# Patient Record
Sex: MALE | Race: WHITE | NOT HISPANIC OR LATINO | URBAN - METROPOLITAN AREA
[De-identification: names, ages, dates, MRNs, and addresses within clinical notes are randomized per-mention and may not be internally consistent; named-entity substitution may affect disease eponyms.]

---

## 2017-10-04 ENCOUNTER — SURGERY - HEALTHEAST (OUTPATIENT)
Dept: SURGERY | Facility: CLINIC | Age: 62
End: 2017-10-04

## 2017-10-04 ENCOUNTER — ANESTHESIA - HEALTHEAST (OUTPATIENT)
Dept: SURGERY | Facility: CLINIC | Age: 62
End: 2017-10-04

## 2017-10-05 ENCOUNTER — HOME CARE/HOSPICE - HEALTHEAST (OUTPATIENT)
Dept: HOME HEALTH SERVICES | Facility: HOME HEALTH | Age: 62
End: 2017-10-05

## 2017-10-12 ENCOUNTER — RECORDS - HEALTHEAST (OUTPATIENT)
Dept: ADMINISTRATIVE | Facility: OTHER | Age: 62
End: 2017-10-12

## 2017-10-23 ENCOUNTER — COMMUNICATION - HEALTHEAST (OUTPATIENT)
Dept: ENDOCRINOLOGY | Facility: CLINIC | Age: 62
End: 2017-10-23

## 2017-10-24 ENCOUNTER — AMBULATORY - HEALTHEAST (OUTPATIENT)
Dept: SCHEDULING | Facility: CLINIC | Age: 62
End: 2017-10-24

## 2017-10-24 DIAGNOSIS — Z79.899 MEDICATION MANAGEMENT: ICD-10-CM

## 2017-10-25 ENCOUNTER — HOSPITAL ENCOUNTER (OUTPATIENT)
Dept: INTERVENTIONAL RADIOLOGY/VASCULAR | Facility: HOSPITAL | Age: 62
Discharge: HOME | End: 2017-10-25
Attending: STUDENT IN AN ORGANIZED HEALTH CARE EDUCATION/TRAINING PROGRAM

## 2017-10-25 ENCOUNTER — OFFICE VISIT - HEALTHEAST (OUTPATIENT)
Dept: OTHER | Facility: HOSPITAL | Age: 62
End: 2017-10-25

## 2017-10-25 DIAGNOSIS — T84.50XA INFECTION AND INFLAMMATORY REACTION DUE TO INTERNAL JOINT PROSTHESIS (H): ICD-10-CM

## 2017-10-27 ENCOUNTER — RECORDS - HEALTHEAST (OUTPATIENT)
Dept: ADMINISTRATIVE | Facility: OTHER | Age: 62
End: 2017-10-27

## 2017-11-01 ENCOUNTER — HOME INFUSION (PRE-WILLOW HOME INFUSION) (OUTPATIENT)
Dept: PHARMACY | Facility: CLINIC | Age: 62
End: 2017-11-01

## 2017-11-03 ENCOUNTER — OFFICE VISIT - HEALTHEAST (OUTPATIENT)
Dept: INFECTIOUS DISEASES | Facility: CLINIC | Age: 62
End: 2017-11-03

## 2017-11-03 DIAGNOSIS — Z96.649 INFECTION OF PROSTHETIC HIP JOINT, INITIAL ENCOUNTER (H): ICD-10-CM

## 2017-11-03 DIAGNOSIS — T84.59XA INFECTION OF PROSTHETIC HIP JOINT, INITIAL ENCOUNTER (H): ICD-10-CM

## 2017-11-08 ENCOUNTER — HOME INFUSION (PRE-WILLOW HOME INFUSION) (OUTPATIENT)
Dept: PHARMACY | Facility: CLINIC | Age: 62
End: 2017-11-08

## 2017-11-09 ENCOUNTER — HOME INFUSION (PRE-WILLOW HOME INFUSION) (OUTPATIENT)
Dept: PHARMACY | Facility: CLINIC | Age: 62
End: 2017-11-09

## 2017-11-10 NOTE — PROGRESS NOTES
This is a recent snapshot of the patient's [facility] medical record.  For current drug dose and complete information and questions, call [phone] or In Basket pool, fv home infusion ([id number])  CSN Number:  [id number]

## 2017-11-15 ENCOUNTER — HOME INFUSION (PRE-WILLOW HOME INFUSION) (OUTPATIENT)
Dept: PHARMACY | Facility: CLINIC | Age: 62
End: 2017-11-15

## 2017-11-15 NOTE — PROGRESS NOTES
This is a recent snapshot of the patient's [facility] medical record.  For current drug dose and complete information and questions, call [phone] or In Basket pool, fv home infusion ([id number])  CSN Number:  [id number]

## 2017-11-16 ENCOUNTER — HOME INFUSION (PRE-WILLOW HOME INFUSION) (OUTPATIENT)
Dept: PHARMACY | Facility: CLINIC | Age: 62
End: 2017-11-16

## 2017-11-16 ENCOUNTER — COMMUNICATION - HEALTHEAST (OUTPATIENT)
Dept: INFECTIOUS DISEASES | Facility: CLINIC | Age: 62
End: 2017-11-16

## 2017-11-17 NOTE — PROGRESS NOTES
This is a recent snapshot of the patient's [facility] medical record.  For current drug dose and complete information and questions, call [phone] or In [facility] pool, fv home infusion ([id number])  CSN Number:  [id number]

## 2017-11-17 NOTE — PROGRESS NOTES
This is a recent snapshot of the patient's [facility] medical record.  For current drug dose and complete information and questions, call [phone] or In Basket pool, fv home infusion ([id number])  CSN Number:  [id number]

## 2017-11-22 ENCOUNTER — COMMUNICATION - HEALTHEAST (OUTPATIENT)
Dept: INFECTIOUS DISEASES | Facility: CLINIC | Age: 62
End: 2017-11-22

## 2017-11-22 DIAGNOSIS — T84.50XD INFECTION OF PROSTHETIC JOINT, SUBSEQUENT ENCOUNTER: ICD-10-CM

## 2017-11-24 ENCOUNTER — HOME INFUSION (PRE-WILLOW HOME INFUSION) (OUTPATIENT)
Dept: PHARMACY | Facility: CLINIC | Age: 62
End: 2017-11-24

## 2017-11-25 ENCOUNTER — HOME INFUSION (PRE-WILLOW HOME INFUSION) (OUTPATIENT)
Dept: PHARMACY | Facility: CLINIC | Age: 62
End: 2017-11-25

## 2017-11-27 NOTE — PROGRESS NOTES
This is a recent snapshot of the patient's [facility] medical record.  For current drug dose and complete information and questions, call [phone] or In Basket pool, fv home infusion ([id number])  CSN Number:  [id number]

## 2017-11-29 ENCOUNTER — HOME INFUSION (PRE-WILLOW HOME INFUSION) (OUTPATIENT)
Dept: PHARMACY | Facility: CLINIC | Age: 62
End: 2017-11-29

## 2017-11-29 NOTE — PROGRESS NOTES
This is a recent snapshot of the patient's [facility] medical record.  For current drug dose and complete information and questions, call [phone] or In Basket pool, fv home infusion ([id number])  CSN Number:  [id number]

## 2017-12-01 NOTE — PROGRESS NOTES
This is a recent snapshot of the patient's [facility] medical record.  For current drug dose and complete information and questions, call [phone] or In Basket pool, fv home infusion ([id number])  CSN Number:  [id number]

## 2017-12-08 ENCOUNTER — RECORDS - HEALTHEAST (OUTPATIENT)
Dept: ADMINISTRATIVE | Facility: OTHER | Age: 62
End: 2017-12-08

## 2017-12-26 ENCOUNTER — COMMUNICATION - HEALTHEAST (OUTPATIENT)
Dept: ADMINISTRATIVE | Facility: CLINIC | Age: 62
End: 2017-12-26

## 2017-12-26 DIAGNOSIS — T84.50XS INFECTION OF PROSTHETIC JOINT, SEQUELA: ICD-10-CM

## 2017-12-27 ENCOUNTER — COMMUNICATION - HEALTHEAST (OUTPATIENT)
Dept: SCHEDULING | Facility: CLINIC | Age: 62
End: 2017-12-27

## 2018-01-12 ENCOUNTER — RECORDS - HEALTHEAST (OUTPATIENT)
Dept: ADMINISTRATIVE | Facility: OTHER | Age: 63
End: 2018-01-12

## 2021-05-31 VITALS — WEIGHT: 264.55 LBS

## 2021-06-13 NOTE — ANESTHESIA PREPROCEDURE EVALUATION
Anesthesia Evaluation      Patient summary reviewed   No history of anesthetic complications     Airway   Mallampati: II   Pulmonary - normal exam   (+) sleep apnea on no CPAP, ,                          Cardiovascular   (+) hypertension, ,     Rhythm: regular  Rate: normal,         Neuro/Psych      Endo/Other - negative ROS      GI/Hepatic/Renal - negative ROS           Dental                         Anesthesia Plan  Planned anesthetic: general LMA and general endotracheal    ASA 3     Anesthetic plan and risks discussed with: patient    Post-op plan: routine recovery

## 2021-06-13 NOTE — ANESTHESIA POSTPROCEDURE EVALUATION
Patient: Kendall Mendez  RESECTION OF  ARTHROPLASTY AND REPLACEMENT WITH ANTIBIOTIC SPACERS AND IRRIGATION AND DEBRIDEMENT, RIGHT HIP  Anesthesia type: general    Patient location: PACU  Last vitals:   Vitals:    10/04/17 2055   BP: 122/67   Pulse: 83   Resp: 18   Temp:    SpO2: 96%     Post vital signs: stable  Level of consciousness: awake, alert, oriented and responds to simple questions  Post-anesthesia pain: pain needs to be addressed; narcotics and adjunct therapy used  Post-anesthesia nausea and vomiting: no  Pulmonary: unassisted, return to baseline  Cardiovascular: stable and blood pressure at baseline  Hydration: adequate  Anesthetic events: no    QCDR Measures:  ASA# 11 - Makayla-op Cardiac Arrest: ASA11B - Patient did NOT experience unanticipated cardiac arrest  ASA# 12 - Makayla-op Mortality Rate: ASA12B - Patient did NOT die  ASA# 13 - PACU Re-Intubation Rate: ASA13B - Patient did NOT require a new airway mgmt  ASA# 10 - Composite Anes Safety: ASA10A - No serious adverse event    Additional Notes:

## 2021-06-13 NOTE — ANESTHESIA CARE TRANSFER NOTE
Last vitals:   Vitals:    10/04/17 2007   BP: 105/62   Pulse: 89   Resp: 25   Temp: 37.5  C (99.5  F)   SpO2: 100%     Patient's level of consciousness is awake  Spontaneous respirations: yes  Maintains airway independently: yes  Dentition unchanged: yes  Oropharynx: oropharynx clear of all foreign objects    QCDR Measures:  ASA# 20 - Surgical Safety Checklist: WHO surgical safety checklist completed prior to induction  PQRS# 430 - Adult PONV Prevention: 4558F-8P - Pt did NOT receive => 2 anti-emetic agents  ASA# 8 - Peds PONV Prevention: NA - Not pediatric patient, not GA or 2 or more risk factors NOT present  PQRS# 424 - Makayla-op Temp Management: 4559F - At least one body temp DOCUMENTED => 35.5C or 95.9F within required timeframe  PQRS# 426 - PACU Transfer Protocol: - Transfer of care checklist used  ASA# 14 - Acute Post-op Pain: ASA14A - Patient experienced pain >= 7 out of 10 Pt breathing spontaneously, follows commands, suctioned extubated. Spontaneous respirations with SFM to PACU, VSS

## 2021-06-16 PROBLEM — M25.559 HIP PAIN: Status: ACTIVE | Noted: 2017-10-04

## 2021-06-25 NOTE — PROGRESS NOTES
Progress Notes by Trae Regalado MD at 11/3/2017  9:00 AM     Author: Trae Regalado MD Service: -- Author Type: Physician    Filed: 11/3/2017  9:45 AM Encounter Date: 11/3/2017 Status: Signed    : Trae Regalado MD (Physician)                                 [facility] post-hospital stay follow up.    Name: Kendall Mendez  :   1955  MRN:   [id number]  PCP:    Jb Guan MD  DOS:    17    CC: Post Hospital Stay follow up for prosthetic R hip infection.     HPI/Interval History:  Kendall Mendez is a 62 y.o. male who is S/P Right hip arthroplasty on 17. He was admitted about a month ago with Early infection right hip arthroplasty, with drainage. He underwent RESECTION OF  ARTHROPLASTY AND REPLACEMENT WITH ANTIBIOTIC SPACERS AND IRRIGATION AND DEBRIDEMENT, 10/4, Dr Solares.  Cultures with MSSA. Discharged on  IV Ancef 2 g every 8 hours and p.o. rifampin 600 mg once daily. Plan 6 weeks from 10/4.  In clinic today, the patient reports feeling much better.  No right hip pain.  He does report having some pain on the anterior aspect of the right thigh.  He has been taking 4 tablets of ibuprofen and Tylenol on a daily basis.  He was seen recently by Ortho and was started on narcotics.  He denies fever, chills, night sweats.  On 2017, the patient's peak came out.  This was replaced on 10/25 2017.  PICC is functioning fine now.  He told me he was seen by Ortho and was told that if he is doing fine may be dealt keep the spacer in place instead of the traditional IV antibiotic followed by antibiotic holiday followed by aspiration followed by a new hip.  He is scheduled to see Dr. Solares in 3-4 weeks.    PMH:  Past Medical History:   Diagnosis Date   ? Hypertension    ? Sleep apnea        PSH:  Past Surgical History:   Procedure Laterality Date   ? PICC AND MIDLINE TEAM LINE INSERTION  10/5/2017        ? REVISION TOTAL HIP ARTHROPLASTY Right 10/4/2017     Procedure: RESECTION OF  ARTHROPLASTY AND REPLACEMENT WITH ANTIBIOTIC SPACERS AND IRRIGATION AND DEBRIDEMENT, RIGHT HIP;  Surgeon: Yaakov Solares MD;  Location: [facility];  Service:        Social History/Family History:  Does not smoke.  No drinking currently.  No family history contributory to the current disease process.      Allergies:  No Known Allergies    Medications:  Current Outpatient Prescriptions on File Prior to Visit   Medication Sig Dispense Refill   ? acetaminophen (TYLENOL) 500 MG tablet Take 2 tablets (1,000 mg total) by mouth 3 (three) times a day.  0   ? aspirin 325 MG tablet Take 1 tablet (325 mg total) by mouth 2 (two) times a day. 60 tablet 0   ? ceFAZolin in D5W (ANCEF) 2 gram/100 mL PgBk Infuse 100 mL (2 g total) into a venous catheter every 8 (eight) hours. 88459 mL 0   ? docusate sodium (COLACE) 100 MG capsule Take 1 capsule (100 mg total) by mouth 2 (two) times a day.  0   ? gabapentin (NEURONTIN) 100 MG capsule Take 200 mg by mouth 2 (two) times a day.     ? losartan (COZAAR) 100 MG tablet Take 100 mg by mouth daily.     ? polyethylene glycol (MIRALAX) 17 gram packet Take 1 packet (17 g total) by mouth daily as needed.  0   ? QUEtiapine (SEROQUEL) 100 MG tablet Take 150 mg by mouth at bedtime.      ? rifAMPin (RIFADIN) 300 MG capsule Take 2 capsules (600 mg total) by mouth daily. 60 capsule 1   ? tamsulosin (FLOMAX) 0.4 mg Cp24 Take 1 capsule (0.4 mg total) by mouth Daily after breakfast. 30 capsule 0   ? venlafaxine (EFFEXOR-XR) 75 MG 24 hr capsule Take 225 mg by mouth daily.     ? [DISCONTINUED] oxyCODONE (ROXICODONE) 5 MG immediate release tablet Take 1-2 tablets (5-10 mg total) by mouth every 4 (four) hours as needed for pain. 50 tablet 0     No current facility-administered medications on file prior to visit.        Review of System:  12 points review of system is negative except for findings in the HPI.    Exam  VS:   Vitals:    11/03/17 0903   BP: 160/82   Pulse: 100      Gen: Pleasant in no acute distress.  HEENT: NCAT. EOMI.  Neck: No LAD.  Lungs: Clear to auscultation bilaterally with no crackles or wheezes.   Card: RRR. No RMG. Peripheral pulses present and symmetrical. No edema.   Abd: Soft NT ND. No hepatomegaly or splenomegaly.  Ext: Right hip incision looks really good with healed wounds, no erythema, no tenderness, no warmth.  No fluctuance.  Lymph: No cervical or supraclavicular adenopathy.  Skin: No rash  Neuro: Alert and oriented to place time and person. Cranial nerves grossly intact.     Labs:  Results    Culture/Gram Stain: Tissue (Order [id number])          Culture/Gram Stain: Tissue   Status:  Final result   Visible to patient:  No (Not Released) Next appt:  None Order: [id number]     Culture      1+ Staphylococcus aureus (!)            Stain      1+ Polymorphonuclear leukocytes          No organisms seen              Resulting Agency: [facility]       Susceptibility       Staphylococcus aureus       NIC       Cefazolin <=2  Sensitive       Clindamycin <=0.5  Sensitive       Doxycycline <=0.5  Sensitive       Erythromycin >4  Resistant       Levofloxacin >4  Resistant       Oxacillin 0.5  Sensitive       Trimethoprim + Sulfamethoxazole <=1/19  Sensitive       Vancomycin <=0.5  Sensitive                          Results for YAZAN SELLERS (MRN [id number]) as of 11/3/2017 08:58   10/7/2017 14:26 10/11/2017 14:30 10/18/2017 13:36 10/25/2017 17:30 11/1/2017 17:30   CRP 15.1 (H) 4.1 (H) 4.1 (H) 3.0 (H) 4.7 (H)       Imaging:  None new.    Assessment:  1. S/P Right hip arthroplasty on 9/7/17  2. Early infection right hip arthroplasty, with drainage, and early presentation  Status post RESECTION OF  ARTHROPLASTY AND REPLACEMENT WITH ANTIBIOTIC SPACERS AND IRRIGATION AND DEBRIDEMENT, 10/4, Dr Solares.  Cultures with MSSA. Discharged on  IV Ancef 2 g every 8 hours and p.o. rifampin 600 mg once daily. Plan 6 weeks from 10/4.   CRP now plateau'ed in the 3-4 range for the past 3  weeks.  Reason for this plateaued CRP is not clear.  Could be from inflammation caused by the need to replace the PICC line?  Next CRP will tell.  He is due to complete 6 weeks of IV antibiotic on 11/14/2017.  I may prolong beyond that point if CRP normal normalize.  Patient is to call me if pain worsens so that we can consider imaging of the hip itself.       Recommendations:  -Continue Ancef 2 gm Q 8 hours till at least 11/14 (6 weeks)  -If CRP not at goal, we will likely prolong the course of IV antibiotic therapy.   -Weekly CBC/Diff, CMP and CRP.   -Typically in this case after we are satisfied with the IV antibiotic therapy, patients get 1-2 weeks of antibiotic holiday, followed by aspiration of the joint for cultures and if cultures are negative new hip can be placed.   -If Ortho wants to go a different route, then once we are satisfied with the IV antibiotic I will start you on oral antibiotic for 6-12 months.  Looking at Levaquin 750 once daily.  -Keep an eye on the pain on the front of the right thigh. Call me if worsens.   -Keep your appointment with ortho in 4 weeks.     CC: Dr. Solares of ortho.       RADHA Regalado  [facility]  [facility]  [phone] Option-2

## 2022-04-20 ENCOUNTER — TRANSCRIBE ORDERS (OUTPATIENT)
Dept: PHYSICAL MEDICINE AND REHAB | Facility: CLINIC | Age: 67
End: 2022-04-20
Payer: COMMERCIAL

## 2022-04-20 DIAGNOSIS — M17.12 OSTEOARTHRITIS OF LEFT KNEE: Primary | ICD-10-CM

## 2023-11-11 NOTE — PROGRESS NOTES
This is a recent snapshot of the patient's [facility] medical record.  For current drug dose and complete information and questions, call [phone] or In Basket pool, fv home infusion ([id number])  CSN Number:  [id number]       Attending Attestation (For Attendings USE Only)...

## 2024-11-11 RX ORDER — ATORVASTATIN CALCIUM 40 MG/1
40 TABLET, FILM COATED ORAL AT BEDTIME
COMMUNITY

## 2024-11-11 RX ORDER — AMLODIPINE BESYLATE 10 MG/1
10 TABLET ORAL AT BEDTIME
COMMUNITY

## 2025-03-03 NOTE — PROGRESS NOTES
Discharge plan according to [facility]:     11/11/24 0832   Discharge Planning   Patient/Family Anticipates Transition to home   Concerns to be Addressed all concerns addressed in this encounter   Living Arrangements   People in Home child(mj), adult   Type of Residence Private Residence   Is your private residence a single family home or apartment? Single family home   Once home, are you able to live on one level? Yes   Which level? Main Level   Bathroom Shower/Tub Tub/Shower unit   Equipment Currently Used at Home none   Support System   Support Systems Children   Do you have someone available to stay with you one or two nights once you are home? Yes

## 2025-03-19 NOTE — H&P (VIEW-ONLY)
Pre-Operative Assessment        3/19/2025   Pre-Op Assessment Procedure Details   Procedure Lt knee replacement   Surgeon Dr Brantley   Location Other   Other Location Name [facility]   Procedure Date 3/26/2025   Fax Number [phone]         Marsha Appiah is a 69 y.o. old male here for pre-operative evaluation for procedure noted above.         Patient Active Problem List    Diagnosis Date Noted     CAREPLAN: ANTICOAGULATION DOAC 06/13/2023     Overview Note:     DOAC Anticoagulation Careplan for Kendall Mendez    Medication: apixaban (Eliquis)  Diagnoses: Atrial Fibrillation  Initiation date (AKA date when started on DOAC medication ): 6/12/23.    Length of treatment: Indefinite.  Comments:      Sarah Darling RN       Paroxysmal atrial fibrillation ([facility]) 06/13/2023     Overview Note:     Formatting of this note might be different from the original.   New atrial fibrillation detected on 6/11/23 on LINQ monitor       Prosthetic hip infection ([facility]) 10/24/2022     Morbid obesity ([facility]) 09/27/2022     Pre-diabetes 09/27/2022     CVA (cerebral vascular accident) ([facility]) 09/24/2022     Spinal stenosis of lumbar region with neurogenic claudication 10/30/2018     Spondylolisthesis at L4-L5 level 10/30/2018     Major depressive disorder, recurrent episode, mild ([facility]) 09/01/2016     Generalized anxiety disorder ([facility]) 09/01/2016     HTN (hypertension) ([facility]) 03/13/2015     Chronic back pain 03/13/2015     Overview Note:     Work comp related       FRANCESCO (obstructive sleep apnea) 03/13/2015     BMI 40.0-44.9, adult ([facility]) 03/13/2015     Dysthymic disorder ([facility]) 12/17/2014     Alcohol dependence in remission ([facility]) 12/17/2014     Overview Note:     Other and unspecified alcohol dependence, in remission          No past medical history on file.     Past Surgical History:   Procedure Laterality Date     hip replacment Right 2017     REVISION TOTAL HIP ARTHROPLASTY Right 09/2018     temporary hip spacer Right 2017    for  infection, 3 weeks post original hip replacement        Current Outpatient Medications   Medication Instructions     amLODIPine (NORVASC) 10 mg, Oral, DAILY     atorvastatin (LIPITOR) 40 MG tablet TAKE 1 TABLET(40 MG) BY MOUTH DAILY     ELIQUIS 5 MG tablet TAKE 1 TABLET(5 MG) BY MOUTH TWICE DAILY     losartan (COZAAR) 100 MG tablet TAKE 1 TABLET(100 MG) BY MOUTH DAILY     metFORMIN (GLUCOPHAGE) 500 mg, Oral, BID WITH MEALS     QUEtiapine (SEROQUEL) 300 mg, Oral, HS     semaglutide-weight management (WEGOVY) 2.4 mg, Subcutaneous, WEEKLY     venlafaxine (EFFEXORXR) 300 mg, Oral, DAILY       Allergies   Allergen Reactions     Lisinopril Cough       Social History     Occupational History     Not on file   Tobacco Use     Smoking status: Former     Current packs/day: 0.00     Average packs/day: 1 pack/day for 10.0 years (10.0 ttl pk-yrs)     Types: Cigarettes     Quit date: 1994     Years since quittin.6     Smokeless tobacco: Never     Tobacco comments:     On off smoker since teen  til adult   Vaping Use     Vaping status: Never Used   Substance and Sexual Activity     Alcohol use: No     Comment: quit 25 years ago     Drug use: No     Sexual activity: Not Currently     Partners: Female     Birth control/protection: Vasectomy         No LMP for male patient.    Family History   Problem Relation Name Age of Onset     Other Birth Mother          hx of aneurysm, thinks heart, not sure     Stroke Birth Father Me          at age of 73-75     Hypertension Brother Me         1     Heart Sister           from heart attack at 66     Heart Brother           from heart attack at age of 65     Cancer, Prostate Negative Family History       Cancer, Colon Negative Family History         Review of Systems:        3/19/2025   ET Amb PreOp Assessment Sx   Have you had a heart attack in the last 30 days? No   Have you experienced chest tightening or chest pressure with activity? No   Do you wake at night with  difficulty breathing? No   Do you have swelling in your feet or ankles? No   Do you get short of breath if lying flat at night? No   Do you hear wheezing or whistling when you breathe? No   Have you had a cough, runny nose, or cold symptoms in the last 2 weeks? No   Have you tested positive for Covid in the last 6 months? No   Do you have a long-standing cough? No   Do you snore or are you sleepy during the day? Yes   Do you have any symptoms due to a recent concussion? No   Do you or close relatives have bleeding or clotting problems? No   Have you taken Aspirin, Ibuprofen (Advil) or Naproxen (Aleve) in the last 7 days? No   Do you or close relatives have a history of a severe or life-threatening reaction to anesthesia? No           Estimated Functional Capacity  Can you climb one flight of stairs, or walk up a gradual uphill without stopping? yes, functional capacity is more than or equal to 4 METS    Objective   /77 (BP Location: Right Arm, BP Cuff Size: Large)   Pulse 80   Wt 254 lb (115.2 kg)   BMI 35.43 kg/m      Physical Exam:  General Appearance: alert, well appearing, and in no apparent distress  Eyes:  lids normal, sclera clear, and conjunctiva normal  ENT:  oropharynx clear  Neck:  no lymphadenopathy and no thyromegaly or nodules  Heart:  regular rate and rhythm and no murmurs, gallops or rubs  Lungs:  clear to auscultation and no wheezes, rales or rhonchi  Abdomen:  soft, nondistended, nontender, no palpable masses, and no organomegaly  Extremities:  no edema  Skin:  no rashes or worrisome lesions  Neurologic:  normal speech and no facial droop    Data:      Labs: Yes:   Sodium   Date Value Ref Range Status   03/19/2025 133 (L) 136 - 145 mmol/L Final     Potassium   Date Value Ref Range Status   03/19/2025 4.2 3.5 - 5.1 mmol/L Final     Creatinine   Date Value Ref Range Status   03/19/2025 0.70 (L) 0.73 - 1.18 mg/dL Final     Hemoglobin   Date Value Ref Range Status   03/19/2025 12.3 (L) 13.5 -  17.5 g/dL Final     Hemoglobin A1C   Date Value Ref Range Status   03/19/2025 5.1 <=5.6 % Final     Glucose   Date Value Ref Range Status   08/16/2024 92 70 - 100 mg/dL Final     Comment:     The given reference range is for the fasting state. Non-fasting reference range for glucose is 70 - 180 mg/dL.     ECG: Today: 3/19/2025.   Sinus rhythm  Minimal voltage criteria for LVH, may be normal variant  Borderline ECG  When compared with ECG of 12-JUN-2024 12:35,  Premature atrial complexes are no longer Present   .    Assessment/Plan   Patient is medically optimized for planned procedure(s).      ICD-10-CM    1. Preop examination  Z01.818 Hgb A1C     Sodium     Potassium     Creatinine / GFR     Complete Blood Count-No Diff      2. Localized osteoarthritis of left knee  M17.12 Hgb A1C     Sodium     Potassium     Creatinine / GFR     Complete Blood Count-No Diff      3. Chronic pain of left knee  M25.562 Hgb A1C    G89.29 Sodium     Potassium     Creatinine / GFR     Complete Blood Count-No Diff      4. Encounter for Medicare annual wellness exam  Z00.00 Hgb A1C     Sodium     Potassium     Creatinine / GFR     Complete Blood Count-No Diff      5. Screening for colon cancer  Z12.11 Hgb A1C     Sodium     Potassium     Creatinine / GFR     Complete Blood Count-No Diff      6. Paroxysmal atrial fibrillation ([facility])  I48.0 Hgb A1C     Sodium     Potassium     Creatinine / GFR     Complete Blood Count-No Diff      7. Hypertension, unspecified type ([facility])  I10 Hgb A1C     Sodium     Potassium     Creatinine / GFR     Complete Blood Count-No Diff      8. Generalized anxiety disorder ([facility])  F41.1 Hgb A1C     Sodium     Potassium     Creatinine / GFR     Complete Blood Count-No Diff      9. Major depressive disorder, recurrent episode, mild ([facility])  F33.0 Hgb A1C     Sodium     Potassium     Creatinine / GFR     Complete Blood Count-No Diff      10. Chronic low back pain without sciatica, unspecified back pain laterality  M54.50  Hgb A1C    G89.29 Sodium     Potassium     Creatinine / GFR     Complete Blood Count-No Diff      11. Spondylolisthesis at L4-L5 level  M43.16 Hgb A1C     Sodium     Potassium     Creatinine / GFR     Complete Blood Count-No Diff      12. FRANCESCO (obstructive sleep apnea)  G47.33 Hgb A1C     Sodium     Potassium     Creatinine / GFR     Complete Blood Count-No Diff      13. Morbid obesity ([facility])  E66.01 Hgb A1C     Sodium     Potassium     Creatinine / GFR     Complete Blood Count-No Diff            Special risks:  Thromboembolism elevated risk, recommend post operative prophylaxis.    Medication recommendations:    Patient Instructions   Annual Wellness Visit Summary    Your care team is recommending the following tests, procedures or services.  Some of these recommendations may not be fully covered by Medicare or your insurance.  If you have questions, check with your insurance to determine coverage before completing these services.     Health Maintenance Due   Health Maintenance Due   Topic Date Due     Hep C Screening (Preventive Services)  Never done     PSA Screening Discussion  Never done     Zoster/Shingles (2 of 2) 02/11/2020     Pneumococcal 50+ Yrs (2 of 2 - PCV) 12/27/2020     Abdominal Aortic Aneurysm (AAA) Screening  Never done     FIT Colon Cancer Screening  10/28/2024     Medicare Annual Wellness Visit  01/01/2025     DTaP/Tdap/Td (2 - Tdap) 03/13/2025     COVID-19 Vaccine (7 - 2024-25 season) 03/19/2025       If your Medicare Welcome or Annual Wellness Visit is showing you are due in the above list, this will be updated after this visit. You had this completed today and are not due for another year.      Thank you for coming in for your Medicare Wellness Visit. To make sure we are doing our best to meet your care needs, here are a few important reminders.   We want to know your thoughts as we work together to create your care plan, including stopping and starting medications. When we work together on  next steps, it's called shared decision making. If there is anything else you would like to discuss, please reach out or schedule a follow-up appointment if needed. We are here to listen.   We want to help you address any concerns you have about the cost of your medications. To find options for the most cost-effective medications near you, go to https://www.Figgu/hp/pharmacy/drug-cost/index.html You can also find more information in this handout.   Health care can be complicated. Sometimes, it can help to share your health information with your family or caregivers. (Caregivers can be friends as well as family.) How much you share is up to you. Here is a helpful link: https://www.[employer]/blog/health-care-proxy-benefits/  We care about nutrition, how much physical activity you get and how much stress, worry or sadness you have in your life. Please reach out to your care team if you have additional information to share or would like more resources or support.   losartan (COZAAR) 100 MG tablet [[id number]]  -  Do not take on the morning of procedure.     metFORMIN (GLUCOPHAGE) 500 MG tablet [[id number]]  -  Do not take within 24 hours of procedure.      ELIQUIS 5 MG tablet [[id number]]       Hold 3 days prior.   semaglutide-weight management (WEGOVY) 2.4 MG/0.75ML pen injection [[id number]]  -  For medications you take weekly: Do not take within 7 days of your procedure. Limit diet to liquids for 24 hours prior to the procedure.    Follow your individualized medication recommendations as described above.    In addition, please stop all over-the-counter medications including aspirin, ibuprofen (Advil, Motrin), naproxen (Aleve, Naprosyn), herbal remedies and supplements one week prior to procedure unless directed otherwise by your care team. You may continue to take acetaminophen (Tylenol) up to the day of your procedure.    Continue all other medications as currently taking. Let your care team  know if you have questions.    On the day of your procedure, do not wear any hair product including hair sprays and gels and avoid using body sprays and deodorants/antiperspirants.    Bring with you to the site of the procedure:    Any oral appliances or CPAP equipment related to sleep apnea  Any other health-related equipment or devices you use daily        Electronically signed by: Luz Marinawjfwm JERICA Kumar DO  3/19/2025, 10:52 AM

## 2025-03-21 RX ORDER — MELOXICAM 7.5 MG/1
7.5 TABLET ORAL DAILY
COMMUNITY

## 2025-03-24 NOTE — TREATMENT PLAN
Orthopedic Surgery Pre-Op Plan: Kendall Mendez  pre-op review. This is NOT an H&P   Surgeon: Dr. Brantley   [facility]: [facility]  Name of Surgery: Left Total Knee Arthroplasty  Date of Surgery: 3/26/25  H&P: Completed on 3/19/25 by Dr. Crenshawjzoe Kumar at [facility].   History of ASA, NSAIDS, vitamin and/or herbal supplements, GLP-1 Agonist or SGLT Inhibitor medication taken within 10 days?: Yes: Meloxicam-patient instructed to hold this for 7 days before surgery.  On semaglutide (Wegovy)-patient was instructed to hold Wegovy for at least 7 days before surgery (confirmed last dose taken on 3/17/2025, then held).  History of blood thinners?: Yes: On chronic anticoagulation with apixaban (Eliquis) for atrial fibrillation. Patient was instructed to hold Eliquis for 3 full days before surgery (take last dose on evening of 3/22/2025, then hold).     Plan:   1) Discharge Plan: Home POD 1 with assist of Adult Children. Please see Discharge Planning section near bottom of this note for further details.     2) Hyponatremia: Mild: Sodium 133 at preop exam on 3/19/2025.  Shows mild hyponatremia that should not affect proceeding with surgery at this level.  Could consider checking postop sodium level.     3) History of CVA: Ischemic stroke 9/24/22. Given tenecteplase followed by 21 days on Aspirin and Plavix. Now on chronic anticoagulation with Eliquis.     4) Paroxysmal Atrial Fibrillation: has implanted [employer] LINQ monitor. Most recent remote device check from 2/12/25 showed normal device function and battery status.  No arrhythmias detected.  PVC burden 3.1%.  On chronic anticoagulation with apixaban (Eliquis) for secondary stroke prevention due to history of paroxysmal atrial fibrillation.  Patient was instructed to hold Eliquis for 3 full days before surgery (take last dose on evening of 3/22/2025, then hold).  After surgery, we will plan to resume Eliquis once safe from a bleeding  The ECG revealed a sinus rhythm. The ECG rate was 61 bpm. standpoint per Dr. Brantley's team.     5) Hyperlipidemia: On atorvastatin.    6) Hypertension: Appears well-controlled on amlodipine and losartan.  Patient was instructed to hold losartan on the morning of surgery but to continue taking amlodipine.    7) Obstructive Sleep Apnea: Uses CPAP.  Patient reminded to bring CPAP machine to the hospital and to use it whenever sleeping or napping.    8) Prediabetes: Most recent hemoglobin A1c 5.1 on 3/19/2025.  On metformin and semaglutide (Wegovy).  Not on insulin.  Patient was instructed to hold Wegovy for at least 7 days before surgery (confirmed last dose taken on 3/17/2025, then held).  We will monitor BG's during hospital stay.  Goal BG  <180 to decrease risk for infection and wound healing complications.     9) Obesity: BMI 35.4, Wt: 253 lbs. Appears patient has had significant weight loss since 2022 when BMI was 48 and wt 344 lbs. Patient should be congratulated on weight loss and encouraged to maintain healthy lifestyle habits.  On semaglutide (Wegovy) which should also help with weight loss.     10) Anxiety and Depression: On venlafaxine.    Patient appears medically optimized for upcoming surgery. I would recommend Hospitalist Consult to assist with medical management. Please call me below with any questions on this patient.       Review of Systems Notable for: Hyponatremia-mild, history of CVA 9/2022, paroxysmal atrial fibrillation-has implanted [employer] Linq monitor, on Eliquis, hyperlipidemia, hypertension, obstructive sleep apnea-uses CPAP, prediabetes, obesity, anxiety, depression.     Past Medical History:   Past Medical History:   Diagnosis Date    Antiplatelet or antithrombotic long-term use     Arrhythmia     Arthritis     Cerebral artery occlusion with cerebral infarction (H)     Diabetes (H)     Hypertension     Sleep apnea      Past Surgical History:   Procedure Laterality Date    ARTHROPLASTY REVISION HIP Right 10/4/2017    Procedure: RESECTION OF   ARTHROPLASTY AND REPLACEMENT WITH ANTIBIOTIC SPACERS AND IRRIGATION AND DEBRIDEMENT, RIGHT HIP;  Surgeon: Yaakov Solares MD;  Location: [facility];  Service:     PICC AND MIDLINE TEAM LINE INSERTION  10/5/2017            Current Medications:  Patient's Medications   New Prescriptions    No medications on file   Previous Medications    ACETAMINOPHEN (TYLENOL) 500 MG TABLET    [ACETAMINOPHEN (TYLENOL) 500 MG TABLET] Take 2 tablets (1,000 mg total) by mouth 3 (three) times a day.    AMLODIPINE (NORVASC) 10 MG TABLET    Take 10 mg by mouth daily.    APIXABAN ANTICOAGULANT (ELIQUIS) 5 MG TABLET    Take 5 mg by mouth 2 times daily. Left message to stop on 3/23/25 before surgery.    ASPIRIN 325 MG TABLET    [ASPIRIN 325 MG TABLET] Take 1 tablet (325 mg total) by mouth 2 (two) times a day.    ATORVASTATIN (LIPITOR) 40 MG TABLET    Take 40 mg by mouth daily.    DOCUSATE SODIUM (COLACE) 100 MG CAPSULE    [DOCUSATE SODIUM (COLACE) 100 MG CAPSULE] Take 1 capsule (100 mg total) by mouth 2 (two) times a day.    GABAPENTIN (NEURONTIN) 100 MG CAPSULE    [GABAPENTIN (NEURONTIN) 100 MG CAPSULE] Take 200 mg by mouth 2 (two) times a day.    LOSARTAN (COZAAR) 100 MG TABLET    [LOSARTAN (COZAAR) 100 MG TABLET] Take 100 mg by mouth daily.    MELOXICAM (MOBIC) 7.5 MG TABLET    Take 7.5 mg by mouth daily.    METFORMIN (GLUCOPHAGE) 500 MG TABLET    Take 500 mg by mouth 2 times daily (with meals).    POLYETHYLENE GLYCOL (MIRALAX) 17 GRAM PACKET    [POLYETHYLENE GLYCOL (MIRALAX) 17 GRAM PACKET] Take 1 packet (17 g total) by mouth daily as needed.    QUETIAPINE (SEROQUEL) 100 MG TABLET    [QUETIAPINE (SEROQUEL) 100 MG TABLET] Take 150 mg by mouth at bedtime.     SEMAGLUTIDE-WEIGHT MANAGEMENT (WEGOVY) 2.4 MG/0.75ML PEN    Inject 2.4 mg subcutaneously once a week. Left message to stop on 3/19/25 before surgery.    TAMSULOSIN (FLOMAX) 0.4 MG CP24    [TAMSULOSIN (FLOMAX) 0.4 MG CP24] Take 1 capsule (0.4 mg total) by mouth Daily after  breakfast.    VENLAFAXINE (EFFEXOR-XR) 75 MG 24 HR CAPSULE    [VENLAFAXINE (EFFEXOR-XR) 75 MG 24 HR CAPSULE] Take 225 mg by mouth daily.   Modified Medications    No medications on file   Discontinued Medications    No medications on file       ALLERGIES:  Allergies   Allergen Reactions    Levaquin [Levofloxacin] Other (See Comments)     Diffuse joint pain, bad enough that he had take time off work. Resolved after discontinuation of Levaquin.     Lisinopril Cough       Social History  Social History     Tobacco Use    Smoking status: Former     Current packs/day: 0.00     Types: Cigarettes     Quit date: 10/4/1990     Years since quittin.4    Smokeless tobacco: Never   Substance Use Topics    Alcohol use: Not Currently     Comment: Sober 30 years    Drug use: Not Currently       Any Abnormal Recent Diagnostics? Yes  Sodium 133 at preop exam on 3/19/2025: Shows mild hyponatremia that should not affect proceeding with surgery at this level.    Discharge Planning:   Discharge plan according to [facility]:    Home POD 1 with assist of Adult Children.    24 0832   Discharge Planning   Patient/Family Anticipates Transition to home   Concerns to be Addressed all concerns addressed in this encounter   Living Arrangements   People in Home child(mj), adult   Type of Residence Private Residence   Is your private residence a single family home or apartment? Single family home   Once home, are you able to live on one level? Yes   Which level? Main Level   Bathroom Shower/Tub Tub/Shower unit   Equipment Currently Used at Home none   Support System   Support Systems Children   Do you have someone available to stay with you one or two nights once you are home? Yes       MIGDALIA Rendon, CNP   Advanced Practice Nurse Navigator- Orthopedics  [facility]   Phone: [phone]

## 2025-03-26 ENCOUNTER — HOSPITAL ENCOUNTER (OUTPATIENT)
Facility: CLINIC | Age: 70
Discharge: HOME | End: 2025-03-27
Attending: ORTHOPAEDIC SURGERY | Admitting: ORTHOPAEDIC SURGERY
Payer: COMMERCIAL

## 2025-03-26 ENCOUNTER — ANESTHESIA EVENT (OUTPATIENT)
Dept: SURGERY | Facility: CLINIC | Age: 70
End: 2025-03-26
Payer: COMMERCIAL

## 2025-03-26 ENCOUNTER — ANESTHESIA (OUTPATIENT)
Dept: SURGERY | Facility: CLINIC | Age: 70
End: 2025-03-26
Payer: COMMERCIAL

## 2025-03-26 ENCOUNTER — APPOINTMENT (OUTPATIENT)
Dept: RADIOLOGY | Facility: CLINIC | Age: 70
End: 2025-03-26
Attending: PHYSICIAN ASSISTANT
Payer: COMMERCIAL

## 2025-03-26 DIAGNOSIS — Z96.652 STATUS POST TOTAL LEFT KNEE REPLACEMENT: Primary | ICD-10-CM

## 2025-03-26 PROBLEM — R73.03 PRE-DIABETES: Status: ACTIVE | Noted: 2022-09-27

## 2025-03-26 PROBLEM — I63.9 CVA (CEREBRAL VASCULAR ACCIDENT) (H): Status: ACTIVE | Noted: 2022-09-24

## 2025-03-26 PROBLEM — I48.0 PAROXYSMAL ATRIAL FIBRILLATION (H): Status: ACTIVE | Noted: 2023-06-13

## 2025-03-26 LAB
EST. AVERAGE GLUCOSE BLD GHB EST-MCNC: 117 MG/DL
GLUCOSE BLDC GLUCOMTR-MCNC: 114 MG/DL (ref 70–99)
GLUCOSE BLDC GLUCOMTR-MCNC: 125 MG/DL (ref 70–99)
GLUCOSE BLDC GLUCOMTR-MCNC: 165 MG/DL (ref 70–99)
GLUCOSE BLDC GLUCOMTR-MCNC: 91 MG/DL (ref 70–99)
HBA1C MFR BLD: 5.7 %
HOLD SPECIMEN: NORMAL

## 2025-03-26 PROCEDURE — 272N000001 HC OR GENERAL SUPPLY STERILE: Performed by: ORTHOPAEDIC SURGERY

## 2025-03-26 PROCEDURE — 360N000077 HC SURGERY LEVEL 4, PER MIN: Performed by: ORTHOPAEDIC SURGERY

## 2025-03-26 PROCEDURE — 250N000009 HC RX 250: Performed by: NURSE ANESTHETIST, CERTIFIED REGISTERED

## 2025-03-26 PROCEDURE — C1776 JOINT DEVICE (IMPLANTABLE): HCPCS | Performed by: ORTHOPAEDIC SURGERY

## 2025-03-26 PROCEDURE — 250N000011 HC RX IP 250 OP 636: Performed by: PHYSICIAN ASSISTANT

## 2025-03-26 PROCEDURE — 250N000009 HC RX 250: Performed by: ANESTHESIOLOGY

## 2025-03-26 PROCEDURE — 999N000157 HC STATISTIC RCP TIME EA 10 MIN

## 2025-03-26 PROCEDURE — 250N000011 HC RX IP 250 OP 636: Performed by: NURSE ANESTHETIST, CERTIFIED REGISTERED

## 2025-03-26 PROCEDURE — 250N000013 HC RX MED GY IP 250 OP 250 PS 637: Performed by: FAMILY MEDICINE

## 2025-03-26 PROCEDURE — 250N000011 HC RX IP 250 OP 636: Performed by: ANESTHESIOLOGY

## 2025-03-26 PROCEDURE — 250N000009 HC RX 250: Performed by: PHYSICIAN ASSISTANT

## 2025-03-26 PROCEDURE — 258N000003 HC RX IP 258 OP 636: Performed by: NURSE ANESTHETIST, CERTIFIED REGISTERED

## 2025-03-26 PROCEDURE — 258N000001 HC RX 258: Performed by: ORTHOPAEDIC SURGERY

## 2025-03-26 PROCEDURE — 250N000012 HC RX MED GY IP 250 OP 636 PS 637: Performed by: FAMILY MEDICINE

## 2025-03-26 PROCEDURE — 258N000003 HC RX IP 258 OP 636: Performed by: ANESTHESIOLOGY

## 2025-03-26 PROCEDURE — C1713 ANCHOR/SCREW BN/BN,TIS/BN: HCPCS | Performed by: ORTHOPAEDIC SURGERY

## 2025-03-26 PROCEDURE — 82962 GLUCOSE BLOOD TEST: CPT

## 2025-03-26 PROCEDURE — 94660 CPAP INITIATION&MGMT: CPT

## 2025-03-26 PROCEDURE — 250N000013 HC RX MED GY IP 250 OP 250 PS 637: Performed by: PHYSICIAN ASSISTANT

## 2025-03-26 PROCEDURE — 999N000065 XR KNEE PORT LEFT 1/2 VIEWS: Mod: LT

## 2025-03-26 PROCEDURE — 99204 OFFICE O/P NEW MOD 45 MIN: CPT | Performed by: FAMILY MEDICINE

## 2025-03-26 PROCEDURE — 250N000013 HC RX MED GY IP 250 OP 250 PS 637: Performed by: ANESTHESIOLOGY

## 2025-03-26 PROCEDURE — 250N000011 HC RX IP 250 OP 636: Mod: JZ | Performed by: ANESTHESIOLOGY

## 2025-03-26 PROCEDURE — 999N000141 HC STATISTIC PRE-PROCEDURE NURSING ASSESSMENT: Performed by: ORTHOPAEDIC SURGERY

## 2025-03-26 PROCEDURE — 36415 COLL VENOUS BLD VENIPUNCTURE: CPT | Performed by: FAMILY MEDICINE

## 2025-03-26 PROCEDURE — 83036 HEMOGLOBIN GLYCOSYLATED A1C: CPT | Performed by: FAMILY MEDICINE

## 2025-03-26 PROCEDURE — 64447 NJX AA&/STRD FEMORAL NRV IMG: CPT | Mod: XU

## 2025-03-26 PROCEDURE — 370N000017 HC ANESTHESIA TECHNICAL FEE, PER MIN: Performed by: ORTHOPAEDIC SURGERY

## 2025-03-26 PROCEDURE — 258N000003 HC RX IP 258 OP 636: Performed by: PHYSICIAN ASSISTANT

## 2025-03-26 PROCEDURE — 710N000010 HC RECOVERY PHASE 1, LEVEL 2, PER MIN: Performed by: ORTHOPAEDIC SURGERY

## 2025-03-26 DEVICE — TIBIAL COMPONENT
Type: IMPLANTABLE DEVICE | Site: KNEE | Status: FUNCTIONAL
Brand: TRIATHLON

## 2025-03-26 DEVICE — FULL DOSE BONE CEMENT, 10 PACK CATALOG NUMBER IS 6191-1-010
Type: IMPLANTABLE DEVICE | Site: KNEE | Status: FUNCTIONAL
Brand: SIMPLEX

## 2025-03-26 DEVICE — TIBIAL BEARING INSERT - CS
Type: IMPLANTABLE DEVICE | Site: KNEE | Status: FUNCTIONAL
Brand: TRIATHLON

## 2025-03-26 DEVICE — PATELLA
Type: IMPLANTABLE DEVICE | Site: KNEE | Status: FUNCTIONAL
Brand: TRIATHLON

## 2025-03-26 DEVICE — PIN FIXATION SS FLUTE SQUARE L3.5 IN OD1/8 IN 7650-2038A: Type: IMPLANTABLE DEVICE | Site: KNEE | Status: FUNCTIONAL

## 2025-03-26 DEVICE — CRUCIATE RETAINING FEMORAL
Type: IMPLANTABLE DEVICE | Site: KNEE | Status: FUNCTIONAL
Brand: TRIATHLON

## 2025-03-26 RX ORDER — ACETAMINOPHEN 325 MG/1
975 TABLET ORAL ONCE
Status: COMPLETED | OUTPATIENT
Start: 2025-03-26 | End: 2025-03-26

## 2025-03-26 RX ORDER — NALOXONE HYDROCHLORIDE 0.4 MG/ML
0.4 INJECTION, SOLUTION INTRAMUSCULAR; INTRAVENOUS; SUBCUTANEOUS
Status: DISCONTINUED | OUTPATIENT
Start: 2025-03-26 | End: 2025-03-27 | Stop reason: HOSPADM

## 2025-03-26 RX ORDER — PROCHLORPERAZINE MALEATE 5 MG/1
5 TABLET ORAL EVERY 6 HOURS PRN
Status: DISCONTINUED | OUTPATIENT
Start: 2025-03-26 | End: 2025-03-27 | Stop reason: HOSPADM

## 2025-03-26 RX ORDER — NALOXONE HYDROCHLORIDE 0.4 MG/ML
0.2 INJECTION, SOLUTION INTRAMUSCULAR; INTRAVENOUS; SUBCUTANEOUS
Status: DISCONTINUED | OUTPATIENT
Start: 2025-03-26 | End: 2025-03-27 | Stop reason: HOSPADM

## 2025-03-26 RX ORDER — BUPIVACAINE HYDROCHLORIDE 7.5 MG/ML
INJECTION, SOLUTION INTRASPINAL
Status: COMPLETED | OUTPATIENT
Start: 2025-03-26 | End: 2025-03-26

## 2025-03-26 RX ORDER — LOSARTAN POTASSIUM 50 MG/1
100 TABLET ORAL DAILY
Status: DISCONTINUED | OUTPATIENT
Start: 2025-03-27 | End: 2025-03-27 | Stop reason: HOSPADM

## 2025-03-26 RX ORDER — NICOTINE POLACRILEX 4 MG
15-30 LOZENGE BUCCAL
Status: DISCONTINUED | OUTPATIENT
Start: 2025-03-26 | End: 2025-03-27 | Stop reason: HOSPADM

## 2025-03-26 RX ORDER — CEFAZOLIN SODIUM/WATER 3 G/30 ML
3 SYRINGE (ML) INTRAVENOUS
Status: COMPLETED | OUTPATIENT
Start: 2025-03-26 | End: 2025-03-26

## 2025-03-26 RX ORDER — DIPHENHYDRAMINE HCL 12.5 MG/5ML
12.5 SOLUTION ORAL EVERY 6 HOURS PRN
Status: DISCONTINUED | OUTPATIENT
Start: 2025-03-26 | End: 2025-03-26 | Stop reason: HOSPADM

## 2025-03-26 RX ORDER — CEFAZOLIN SODIUM 2 G/50ML
2 SOLUTION INTRAVENOUS EVERY 8 HOURS
Status: COMPLETED | OUTPATIENT
Start: 2025-03-26 | End: 2025-03-27

## 2025-03-26 RX ORDER — HYDROMORPHONE HCL IN WATER/PF 6 MG/30 ML
0.2 PATIENT CONTROLLED ANALGESIA SYRINGE INTRAVENOUS EVERY 5 MIN PRN
Status: DISCONTINUED | OUTPATIENT
Start: 2025-03-26 | End: 2025-03-26 | Stop reason: HOSPADM

## 2025-03-26 RX ORDER — DEXTROSE MONOHYDRATE 25 G/50ML
25-50 INJECTION, SOLUTION INTRAVENOUS
Status: DISCONTINUED | OUTPATIENT
Start: 2025-03-26 | End: 2025-03-27 | Stop reason: HOSPADM

## 2025-03-26 RX ORDER — SODIUM CHLORIDE, SODIUM LACTATE, POTASSIUM CHLORIDE, CALCIUM CHLORIDE 600; 310; 30; 20 MG/100ML; MG/100ML; MG/100ML; MG/100ML
INJECTION, SOLUTION INTRAVENOUS CONTINUOUS
Status: DISCONTINUED | OUTPATIENT
Start: 2025-03-26 | End: 2025-03-27 | Stop reason: HOSPADM

## 2025-03-26 RX ORDER — DEXAMETHASONE SODIUM PHOSPHATE 4 MG/ML
4 INJECTION, SOLUTION INTRA-ARTICULAR; INTRALESIONAL; INTRAMUSCULAR; INTRAVENOUS; SOFT TISSUE
Status: DISCONTINUED | OUTPATIENT
Start: 2025-03-26 | End: 2025-03-26 | Stop reason: HOSPADM

## 2025-03-26 RX ORDER — ACETAMINOPHEN 325 MG/1
650 TABLET ORAL EVERY 4 HOURS PRN
COMMUNITY
Start: 2025-03-26

## 2025-03-26 RX ORDER — PROPOFOL 10 MG/ML
INJECTION, EMULSION INTRAVENOUS PRN
Status: DISCONTINUED | OUTPATIENT
Start: 2025-03-26 | End: 2025-03-26

## 2025-03-26 RX ORDER — BISACODYL 10 MG
10 SUPPOSITORY, RECTAL RECTAL DAILY PRN
Status: DISCONTINUED | OUTPATIENT
Start: 2025-03-29 | End: 2025-03-27 | Stop reason: HOSPADM

## 2025-03-26 RX ORDER — HYDROMORPHONE HCL IN WATER/PF 6 MG/30 ML
0.1 PATIENT CONTROLLED ANALGESIA SYRINGE INTRAVENOUS EVERY 4 HOURS PRN
Status: DISCONTINUED | OUTPATIENT
Start: 2025-03-26 | End: 2025-03-27 | Stop reason: HOSPADM

## 2025-03-26 RX ORDER — OXYCODONE HYDROCHLORIDE 5 MG/1
5-10 TABLET ORAL EVERY 4 HOURS PRN
Qty: 26 TABLET | Refills: 0 | Status: SHIPPED | OUTPATIENT
Start: 2025-03-26 | End: 2025-03-27

## 2025-03-26 RX ORDER — DIPHENHYDRAMINE HYDROCHLORIDE 50 MG/ML
12.5 INJECTION, SOLUTION INTRAMUSCULAR; INTRAVENOUS EVERY 6 HOURS PRN
Status: DISCONTINUED | OUTPATIENT
Start: 2025-03-26 | End: 2025-03-26 | Stop reason: HOSPADM

## 2025-03-26 RX ORDER — FLUMAZENIL 0.1 MG/ML
0.2 INJECTION, SOLUTION INTRAVENOUS
Status: DISCONTINUED | OUTPATIENT
Start: 2025-03-26 | End: 2025-03-26 | Stop reason: HOSPADM

## 2025-03-26 RX ORDER — ONDANSETRON 4 MG/1
4 TABLET, ORALLY DISINTEGRATING ORAL EVERY 6 HOURS PRN
Status: DISCONTINUED | OUTPATIENT
Start: 2025-03-26 | End: 2025-03-27 | Stop reason: HOSPADM

## 2025-03-26 RX ORDER — ONDANSETRON 2 MG/ML
4 INJECTION INTRAMUSCULAR; INTRAVENOUS EVERY 30 MIN PRN
Status: DISCONTINUED | OUTPATIENT
Start: 2025-03-26 | End: 2025-03-26 | Stop reason: HOSPADM

## 2025-03-26 RX ORDER — DEXAMETHASONE SODIUM PHOSPHATE 4 MG/ML
INJECTION, SOLUTION INTRA-ARTICULAR; INTRALESIONAL; INTRAMUSCULAR; INTRAVENOUS; SOFT TISSUE PRN
Status: DISCONTINUED | OUTPATIENT
Start: 2025-03-26 | End: 2025-03-26

## 2025-03-26 RX ORDER — CEFADROXIL 500 MG/1
500 CAPSULE ORAL 2 TIMES DAILY
Qty: 14 CAPSULE | Refills: 0 | Status: SHIPPED | OUTPATIENT
Start: 2025-03-26 | End: 2025-03-27

## 2025-03-26 RX ORDER — AMOXICILLIN 250 MG
1-2 CAPSULE ORAL 2 TIMES DAILY
COMMUNITY
Start: 2025-03-26

## 2025-03-26 RX ORDER — SODIUM CHLORIDE, SODIUM LACTATE, POTASSIUM CHLORIDE, CALCIUM CHLORIDE 600; 310; 30; 20 MG/100ML; MG/100ML; MG/100ML; MG/100ML
INJECTION, SOLUTION INTRAVENOUS CONTINUOUS
Status: DISCONTINUED | OUTPATIENT
Start: 2025-03-26 | End: 2025-03-26 | Stop reason: HOSPADM

## 2025-03-26 RX ORDER — FENTANYL CITRATE 50 UG/ML
25-100 INJECTION, SOLUTION INTRAMUSCULAR; INTRAVENOUS
Status: DISCONTINUED | OUTPATIENT
Start: 2025-03-26 | End: 2025-03-26 | Stop reason: HOSPADM

## 2025-03-26 RX ORDER — POLYETHYLENE GLYCOL 3350 17 G/17G
17 POWDER, FOR SOLUTION ORAL DAILY
Status: DISCONTINUED | OUTPATIENT
Start: 2025-03-27 | End: 2025-03-27 | Stop reason: HOSPADM

## 2025-03-26 RX ORDER — ACETAMINOPHEN 325 MG/1
975 TABLET ORAL EVERY 8 HOURS
Status: DISCONTINUED | OUTPATIENT
Start: 2025-03-26 | End: 2025-03-27 | Stop reason: HOSPADM

## 2025-03-26 RX ORDER — HYDROMORPHONE HCL IN WATER/PF 6 MG/30 ML
0.4 PATIENT CONTROLLED ANALGESIA SYRINGE INTRAVENOUS EVERY 5 MIN PRN
Status: DISCONTINUED | OUTPATIENT
Start: 2025-03-26 | End: 2025-03-26 | Stop reason: HOSPADM

## 2025-03-26 RX ORDER — TRANEXAMIC ACID 650 MG/1
1950 TABLET ORAL ONCE
Status: COMPLETED | OUTPATIENT
Start: 2025-03-26 | End: 2025-03-26

## 2025-03-26 RX ORDER — QUETIAPINE FUMARATE 100 MG/1
300 TABLET, FILM COATED ORAL AT BEDTIME
Status: DISCONTINUED | OUTPATIENT
Start: 2025-03-26 | End: 2025-03-27 | Stop reason: HOSPADM

## 2025-03-26 RX ORDER — PROPOFOL 10 MG/ML
INJECTION, EMULSION INTRAVENOUS CONTINUOUS PRN
Status: DISCONTINUED | OUTPATIENT
Start: 2025-03-26 | End: 2025-03-26

## 2025-03-26 RX ORDER — LIDOCAINE 40 MG/G
CREAM TOPICAL
Status: DISCONTINUED | OUTPATIENT
Start: 2025-03-26 | End: 2025-03-26 | Stop reason: HOSPADM

## 2025-03-26 RX ORDER — FENTANYL CITRATE 50 UG/ML
25 INJECTION, SOLUTION INTRAMUSCULAR; INTRAVENOUS EVERY 5 MIN PRN
Status: DISCONTINUED | OUTPATIENT
Start: 2025-03-26 | End: 2025-03-26 | Stop reason: HOSPADM

## 2025-03-26 RX ORDER — NALOXONE HYDROCHLORIDE 0.4 MG/ML
0.1 INJECTION, SOLUTION INTRAMUSCULAR; INTRAVENOUS; SUBCUTANEOUS
Status: DISCONTINUED | OUTPATIENT
Start: 2025-03-26 | End: 2025-03-26 | Stop reason: HOSPADM

## 2025-03-26 RX ORDER — ONDANSETRON 2 MG/ML
4 INJECTION INTRAMUSCULAR; INTRAVENOUS EVERY 6 HOURS PRN
Status: DISCONTINUED | OUTPATIENT
Start: 2025-03-26 | End: 2025-03-27 | Stop reason: HOSPADM

## 2025-03-26 RX ORDER — FENTANYL CITRATE 50 UG/ML
50 INJECTION, SOLUTION INTRAMUSCULAR; INTRAVENOUS EVERY 5 MIN PRN
Status: DISCONTINUED | OUTPATIENT
Start: 2025-03-26 | End: 2025-03-26 | Stop reason: HOSPADM

## 2025-03-26 RX ORDER — AMOXICILLIN 250 MG
1 CAPSULE ORAL 2 TIMES DAILY
Status: DISCONTINUED | OUTPATIENT
Start: 2025-03-26 | End: 2025-03-27 | Stop reason: HOSPADM

## 2025-03-26 RX ORDER — VENLAFAXINE HYDROCHLORIDE 150 MG/1
300 CAPSULE, EXTENDED RELEASE ORAL DAILY
Status: DISCONTINUED | OUTPATIENT
Start: 2025-03-27 | End: 2025-03-27 | Stop reason: HOSPADM

## 2025-03-26 RX ORDER — CEFAZOLIN SODIUM/WATER 3 G/30 ML
3 SYRINGE (ML) INTRAVENOUS SEE ADMIN INSTRUCTIONS
Status: DISCONTINUED | OUTPATIENT
Start: 2025-03-26 | End: 2025-03-26 | Stop reason: HOSPADM

## 2025-03-26 RX ORDER — LIDOCAINE 40 MG/G
CREAM TOPICAL
Status: DISCONTINUED | OUTPATIENT
Start: 2025-03-26 | End: 2025-03-27 | Stop reason: HOSPADM

## 2025-03-26 RX ORDER — HYDROXYZINE HYDROCHLORIDE 10 MG/1
10 TABLET, FILM COATED ORAL EVERY 6 HOURS PRN
Qty: 30 TABLET | Refills: 0 | Status: SHIPPED | OUTPATIENT
Start: 2025-03-26 | End: 2025-03-27

## 2025-03-26 RX ORDER — HALOPERIDOL 5 MG/ML
1 INJECTION INTRAMUSCULAR
Status: DISCONTINUED | OUTPATIENT
Start: 2025-03-26 | End: 2025-03-26 | Stop reason: HOSPADM

## 2025-03-26 RX ORDER — ONDANSETRON 4 MG/1
4 TABLET, ORALLY DISINTEGRATING ORAL EVERY 30 MIN PRN
Status: DISCONTINUED | OUTPATIENT
Start: 2025-03-26 | End: 2025-03-26 | Stop reason: HOSPADM

## 2025-03-26 RX ORDER — BUPIVACAINE HYDROCHLORIDE 5 MG/ML
INJECTION, SOLUTION EPIDURAL; INTRACAUDAL; PERINEURAL
Status: COMPLETED | OUTPATIENT
Start: 2025-03-26 | End: 2025-03-26

## 2025-03-26 RX ORDER — AMLODIPINE BESYLATE 10 MG/1
10 TABLET ORAL AT BEDTIME
Status: DISCONTINUED | OUTPATIENT
Start: 2025-03-26 | End: 2025-03-27 | Stop reason: HOSPADM

## 2025-03-26 RX ORDER — ATORVASTATIN CALCIUM 40 MG/1
40 TABLET, FILM COATED ORAL AT BEDTIME
Status: DISCONTINUED | OUTPATIENT
Start: 2025-03-26 | End: 2025-03-27 | Stop reason: HOSPADM

## 2025-03-26 RX ORDER — HYDROMORPHONE HCL IN WATER/PF 6 MG/30 ML
0.2 PATIENT CONTROLLED ANALGESIA SYRINGE INTRAVENOUS EVERY 4 HOURS PRN
Status: DISCONTINUED | OUTPATIENT
Start: 2025-03-26 | End: 2025-03-27 | Stop reason: HOSPADM

## 2025-03-26 RX ORDER — ONDANSETRON 2 MG/ML
INJECTION INTRAMUSCULAR; INTRAVENOUS PRN
Status: DISCONTINUED | OUTPATIENT
Start: 2025-03-26 | End: 2025-03-26

## 2025-03-26 RX ORDER — OXYCODONE HYDROCHLORIDE 5 MG/1
5 TABLET ORAL EVERY 4 HOURS PRN
Status: DISCONTINUED | OUTPATIENT
Start: 2025-03-26 | End: 2025-03-27 | Stop reason: HOSPADM

## 2025-03-26 RX ADMIN — DEXMEDETOMIDINE HYDROCHLORIDE 12 MCG: 100 INJECTION, SOLUTION INTRAVENOUS at 12:05

## 2025-03-26 RX ADMIN — OXYCODONE 5 MG: 5 TABLET ORAL at 22:19

## 2025-03-26 RX ADMIN — PROPOFOL 15 MG: 10 INJECTION, EMULSION INTRAVENOUS at 12:05

## 2025-03-26 RX ADMIN — PHENYLEPHRINE HYDROCHLORIDE 0.2 MCG/KG/MIN: 10 INJECTION INTRAVENOUS at 12:09

## 2025-03-26 RX ADMIN — ATORVASTATIN CALCIUM 40 MG: 40 TABLET, FILM COATED ORAL at 21:05

## 2025-03-26 RX ADMIN — ACETAMINOPHEN 975 MG: 325 TABLET ORAL at 17:09

## 2025-03-26 RX ADMIN — AMLODIPINE BESYLATE 10 MG: 10 TABLET ORAL at 21:05

## 2025-03-26 RX ADMIN — PROPOFOL 15 MG: 10 INJECTION, EMULSION INTRAVENOUS at 13:40

## 2025-03-26 RX ADMIN — INSULIN ASPART 1 UNITS: 100 INJECTION, SOLUTION INTRAVENOUS; SUBCUTANEOUS at 17:59

## 2025-03-26 RX ADMIN — LIDOCAINE HYDROCHLORIDE 5 ML: 10 INJECTION, SOLUTION EPIDURAL; INFILTRATION; INTRACAUDAL; PERINEURAL at 11:55

## 2025-03-26 RX ADMIN — BUPIVACAINE HYDROCHLORIDE 15 ML: 5 INJECTION, SOLUTION EPIDURAL; INTRACAUDAL; PERINEURAL at 11:22

## 2025-03-26 RX ADMIN — CEFAZOLIN SODIUM 2 G: 2 SOLUTION INTRAVENOUS at 19:09

## 2025-03-26 RX ADMIN — BUPIVACAINE HYDROCHLORIDE IN DEXTROSE 2 ML: 7.5 INJECTION, SOLUTION SUBARACHNOID at 12:00

## 2025-03-26 RX ADMIN — SODIUM CHLORIDE, SODIUM LACTATE, POTASSIUM CHLORIDE, AND CALCIUM CHLORIDE: .6; .31; .03; .02 INJECTION, SOLUTION INTRAVENOUS at 17:00

## 2025-03-26 RX ADMIN — SODIUM CHLORIDE, SODIUM LACTATE, POTASSIUM CHLORIDE, AND CALCIUM CHLORIDE: .6; .31; .03; .02 INJECTION, SOLUTION INTRAVENOUS at 13:40

## 2025-03-26 RX ADMIN — MIDAZOLAM HYDROCHLORIDE 1 MG: 1 INJECTION, SOLUTION INTRAMUSCULAR; INTRAVENOUS at 11:18

## 2025-03-26 RX ADMIN — SODIUM CHLORIDE, SODIUM LACTATE, POTASSIUM CHLORIDE, AND CALCIUM CHLORIDE: .6; .31; .03; .02 INJECTION, SOLUTION INTRAVENOUS at 10:18

## 2025-03-26 RX ADMIN — ACETAMINOPHEN 975 MG: 325 TABLET ORAL at 10:16

## 2025-03-26 RX ADMIN — OXYCODONE 5 MG: 5 TABLET ORAL at 17:09

## 2025-03-26 RX ADMIN — PHENYLEPHRINE HYDROCHLORIDE 100 MCG: 10 INJECTION INTRAVENOUS at 13:33

## 2025-03-26 RX ADMIN — Medication 3 G: at 11:49

## 2025-03-26 RX ADMIN — ONDANSETRON 4 MG: 2 INJECTION INTRAMUSCULAR; INTRAVENOUS at 11:55

## 2025-03-26 RX ADMIN — FENTANYL CITRATE 50 MCG: 50 INJECTION INTRAMUSCULAR; INTRAVENOUS at 11:18

## 2025-03-26 RX ADMIN — SENNOSIDES AND DOCUSATE SODIUM 1 TABLET: 50; 8.6 TABLET ORAL at 21:05

## 2025-03-26 RX ADMIN — DEXMEDETOMIDINE HYDROCHLORIDE 8 MCG: 100 INJECTION, SOLUTION INTRAVENOUS at 12:23

## 2025-03-26 RX ADMIN — PROPOFOL 85 MCG/KG/MIN: 10 INJECTION, EMULSION INTRAVENOUS at 11:58

## 2025-03-26 RX ADMIN — QUETIAPINE FUMARATE 300 MG: 300 TABLET ORAL at 21:06

## 2025-03-26 RX ADMIN — DEXAMETHASONE SODIUM PHOSPHATE 4 MG: 4 INJECTION, SOLUTION INTRA-ARTICULAR; INTRALESIONAL; INTRAMUSCULAR; INTRAVENOUS; SOFT TISSUE at 12:14

## 2025-03-26 RX ADMIN — TRANEXAMIC ACID 1950 MG: 650 TABLET ORAL at 10:16

## 2025-03-26 ASSESSMENT — ACTIVITIES OF DAILY LIVING (ADL)
ADLS_ACUITY_SCORE: 21
ADLS_ACUITY_SCORE: 23
ADLS_ACUITY_SCORE: 21
ADLS_ACUITY_SCORE: 24
ADLS_ACUITY_SCORE: 24
ADLS_ACUITY_SCORE: 23
ADLS_ACUITY_SCORE: 21
ADLS_ACUITY_SCORE: 21

## 2025-03-26 ASSESSMENT — ENCOUNTER SYMPTOMS: DYSRHYTHMIAS: 1

## 2025-03-26 NOTE — PHARMACY-ADMISSION MEDICATION HISTORY
Pharmacist Admission Medication History    Admission medication history is complete. The information provided in this note is only as accurate as the sources available at the time of the update.    Information Source(s): Patient via in-person  Allergies reviewed with patient and updates made in EHR: yes    Medication History Completed By: Miladys Hilton RADHA 3/26/2025 10:52 AM    PTA Med List   Medication Sig Note Last Dose/Taking    acetaminophen (TYLENOL) 500 MG tablet [ACETAMINOPHEN (TYLENOL) 500 MG TABLET] Take 2 tablets (1,000 mg total) by mouth 3 (three) times a day.  More than a month    amLODIPine (NORVASC) 10 MG tablet Take 10 mg by mouth at bedtime.  3/25/2025 Bedtime    apixaban ANTICOAGULANT (ELIQUIS) 5 MG tablet Take 5 mg by mouth 2 times daily. Left message to stop on 3/23/25 before surgery. 3/21/2025: Saturday 3/22 will be last dose 3/22/2025 Bedtime    atorvastatin (LIPITOR) 40 MG tablet Take 40 mg by mouth at bedtime.  3/25/2025 Bedtime    docusate sodium (COLACE) 100 MG capsule [DOCUSATE SODIUM (COLACE) 100 MG CAPSULE] Take 1 capsule (100 mg total) by mouth 2 (two) times a day.  More than a month    losartan (COZAAR) 100 MG tablet [LOSARTAN (COZAAR) 100 MG TABLET] Take 100 mg by mouth daily.  3/26/2025 Morning    meloxicam (MOBIC) 7.5 MG tablet Take 7.5 mg by mouth daily.  3/19/2025    metFORMIN (GLUCOPHAGE) 500 MG tablet Take 500 mg by mouth 2 times daily (with meals).  3/26/2025 Morning    polyethylene glycol (MIRALAX) 17 gram packet [POLYETHYLENE GLYCOL (MIRALAX) 17 GRAM PACKET] Take 1 packet (17 g total) by mouth daily as needed.  More than a month    QUEtiapine (SEROQUEL) 100 MG tablet Take 300 mg by mouth at bedtime.  3/25/2025 Bedtime    Semaglutide-Weight Management (WEGOVY) 2.4 MG/0.75ML pen Inject 2.4 mg subcutaneously once a week. Left message to stop on 3/19/25 before surgery.  3/17/2025    venlafaxine (EFFEXOR XR) 150 MG 24 hr capsule Take 300 mg by mouth daily.  3/26/2025 Morning

## 2025-03-26 NOTE — ANESTHESIA POSTPROCEDURE EVALUATION
Patient: Kendall Mendez    Procedure: Procedure(s):  LEFT TOTAL KNEE ARTHROPLASTY       Anesthesia Type:  Spinal    Note:  Disposition: Inpatient   Postop Pain Control: Uneventful            Sign Out: Well controlled pain   PONV: No   Neuro/Psych: Uneventful            Sign Out: Acceptable/Baseline neuro status   Airway/Respiratory: Uneventful            Sign Out: Acceptable/Baseline resp. status   CV/Hemodynamics: Uneventful            Sign Out: Acceptable CV status; No obvious hypovolemia; No obvious fluid overload   Other NRE: NONE   DID A NON-ROUTINE EVENT OCCUR? No    Event details/Postop Comments:  SAB resolving. No current complaints.         Last vitals:  Vitals Value Taken Time   /69 03/26/25 1530   Temp 36.6  C (97.9  F) 03/26/25 1500   Pulse 88 03/26/25 1540   Resp 24 03/26/25 1520   SpO2 96 % 03/26/25 1540   Vitals shown include unfiled device data.    Electronically Signed By: Nadeem Mayer MD  March 26, 2025  3:42 PM

## 2025-03-26 NOTE — ANESTHESIA PROCEDURE NOTES
Adductor canal Procedure Note    Pre-Procedure   Staff -        Anesthesiologist:  Nadeem Mayer MD       Performed By: anesthesiologist       Location: pre-op       Procedure Start/Stop Times: 3/26/2025 11:20 AM and 3/26/2025 11:22 AM       Pre-Anesthestic Checklist: patient identified, IV checked, site marked, risks and benefits discussed, informed consent, monitors and equipment checked, pre-op evaluation, at physician/surgeon's request and post-op pain management  Timeout:       Correct Patient: Yes        Correct Procedure: Yes        Correct Site: Yes        Correct Position: Yes        Correct Laterality: Yes        Site Marked: Yes  Procedure Documentation  Procedure: Adductor canal         Laterality: left       Patient Position: supine       Patient Prep/Sterile Barriers: sterile gloves, mask       Skin prep: Chloraprep       Needle Type: other       Needle Gauge: 20.        Needle Length (Inches): 4        Ultrasound guided       1. Ultrasound was used to identify targeted nerve, plexus, vascular marker, or fascial plane and place a needle adjacent to it in real-time.       2. Ultrasound was used to visualize the spread of anesthetic in close proximity to the above referenced structure.       3. A permanent image is entered into the patient's record.       4. The visualized anatomic structures appeared normal.       5. There were no apparent abnormal pathologic findings.    Assessment/Narrative         The placement was negative for: blood aspirated, painful injection and site bleeding       Paresthesias: No.       Bolus given via needle..        Secured via.        Insertion/Infusion Method: Single Shot       Complications: none    Medication(s) Administered   Bupivacaine 0.5% PF (Infiltration) - Infiltration   15 mL - 3/26/2025 11:22:00 AM  Medication Administration Time: 3/26/2025 11:20 AM      FOR [facility] ([facility]) ONLY:   Pain Team Contact information: please page the Pain Team Via [employer].  "Search \"Pain\". During daytime hours, please page the attending first. At night please page the resident first.      "

## 2025-03-26 NOTE — CONSULTS
[facility] MEDICINE CONSULT NOTE   Physician requesting consult: Obed Brantley MD    Reason for consult: Postoperative medical management of medical co-morbidities as below    Assessment and Plan:   Kendall Mendez is a 69 year old male with a PMH of paroxysmal A-fib, CVA, chronically on Eliquis, HTN, FRANCESCO on CPAP, depression, alcohol abuse sober since 1994 and borderline diabetes.  Underwent left total knee arthroplasty.       Status post left total knee arthroplasty  Postoperative management per orthopedics.  Preoperative hemoglobin 12.3.    Obstructive sleep apnea  Utilize home CPAP per standard protocols.    History of CVA  Essential hypertension  Paroxysmal A-fib  Patient denies any deficits left over from his stroke.  Order home Norvasc and losartan with hold parameters.  Order bedtime Lipitor.    Pre-diabetes  Check an A1c.  Follow glucose readings 4 times a day.  Hold his Wegovy while hospitalized.  Continue metformin 500 twice daily on 3/27.    Anxiety/depression  Order home Effexor and bedtime Seroquel.    History of alcohol dependency in remission  Sober since 1994.    Chronic anticoagulation.  Chronically on Eliquis 5 mg twice daily.  Held since 3/22.  Plan to resume Eliquis on 3/27 in the AM per surgery.      Fluids: Per surgery  Pain meds: Per surgery  Therapy: Per surgery  Craven:Not present  Lines: None       Current Diet  Orders Placed This Encounter      Discharge Instruction - Regular Diet Adult    Supplements  None    Lines/Drains/Airways       PIV Line  Duration             Peripheral IV 03/26/25 Left;Posterior Lower forearm <1 day              Wound  Duration             Incision/Surgical Site 03/26/25 Left Knee <1 day                    Disposition  Per surgery    Code status:No Order   [facility] [place] was asked to evaluate patient for postoperative medical management as follows below. Please resume the home medications as reconciled and further noted with ordered  "hold parameters.  Thank you for this consult; we will continue to follow this patient until discharge.    Procedure(s):  LEFT TOTAL KNEE ARTHROPLASTY  Day of Surgery  Estimated Blood Loss:  50 mL  Hospital Problem List   No problem-specific Assessment & Plan notes found for this encounter.    Active Problems:    S/P total knee arthroplasty, left      -Reviewed the patient's preoperative H and P and updated missing elements.  -Home medication reconciliation has been reviewed.  Medications have been ordered as noted from the home list and changes are documented above     Clinically Significant Risk Factors Present on Admission                # Drug Induced Coagulation Defect: home medication list includes an anticoagulant medication    # Hypertension: Noted on problem list           # Obesity: Estimated body mass index is 34.17 kg/m  as calculated from the following:    Height as of this encounter: 1.803 m (5' 11\").    Weight as of this encounter: 111.1 kg (245 lb).              HISTORY     Kendall Mendez is a 69 year old male with PMH of paroxysmal A-fib, CVA, chronically on Eliquis, HTN, FRANCESCO on CPAP, depression, alcohol abuse sober since 1994 and borderline diabetes.  Underwent left total knee arthroplasty.  Doing well postoperatively.  Pain under good control.  No chest pain.  No shortness of breath.  No nausea or vomiting.  Patient denies any deficits left over from his CVA.  May be occasional word searching but that is it.  Does utilize CPAP for his sleep apnea.  A-fib has been under good control.  States he has pretty diabetes but it is controlled with Ozempic and metformin.  Has been sober from alcohol and smoking since 1994.  Denies personal history of heart attack, cancer, DVT, PE, peptic ulcer disease.  .  Questions answered to verbalized satisfaction.    Past Medical History     Past Medical History:  No date: Antiplatelet or antithrombotic long-term use  No date: Arrhythmia  No date: Arthritis  No date: " Cerebral artery occlusion with cerebral infarction (H)  No date: Diabetes (H)  No date: Hypertension  No date: Sleep apnea     Patient Active Problem List    Diagnosis Date Noted    S/P total knee arthroplasty, left 2025     Priority: Medium    Urine retention      Priority: Medium    Infection of prosthetic hip joint, initial encounter      Priority: Medium    Hypertension goal BP (blood pressure) < 130/80      Priority: Medium    Acute blood loss anemia      Priority: Medium    Obstructive sleep apnea      Priority: Medium    Depression, unspecified depression type      Priority: Medium    Hip pain 10/04/2017     Priority: Medium     Surgical History     Past Surgical History:   Procedure Laterality Date    ARTHROPLASTY REVISION HIP Right 10/4/2017    Procedure: RESECTION OF  ARTHROPLASTY AND REPLACEMENT WITH ANTIBIOTIC SPACERS AND IRRIGATION AND DEBRIDEMENT, RIGHT HIP;  Surgeon: Yaakov Solares MD;  Location: [facility];  Service:     PICC AND MIDLINE TEAM LINE INSERTION  10/5/2017          Family History    History reviewed. No pertinent family history.   Social History      Social History     Tobacco Use    Smoking status: Former     Current packs/day: 0.00     Types: Cigarettes     Quit date: 10/4/1990     Years since quittin.4    Smokeless tobacco: Never   Substance Use Topics    Alcohol use: Not Currently     Comment: Sober 30 years    Drug use: Not Currently      Allergies     Allergies   Allergen Reactions    Levaquin [Levofloxacin] Other (See Comments)     Diffuse joint pain, bad enough that he had take time off work. Resolved after discontinuation of Levaquin.     Lisinopril Cough       Prior to Admission Medications      Prior to Admission Medications   Prescriptions Last Dose Informant Patient Reported? Taking?   QUEtiapine (SEROQUEL) 100 MG tablet 3/25/2025 Bedtime  Yes Yes   Sig: Take 300 mg by mouth at bedtime.   Semaglutide-Weight Management (WEGOVY) 2.4 MG/0.75ML pen  3/17/2025  Yes Yes   Sig: Inject 2.4 mg subcutaneously once a week. Left message to stop on 3/19/25 before surgery.   acetaminophen (TYLENOL) 500 MG tablet More than a month  No Yes   Sig: [ACETAMINOPHEN (TYLENOL) 500 MG TABLET] Take 2 tablets (1,000 mg total) by mouth 3 (three) times a day.   amLODIPine (NORVASC) 10 MG tablet 3/25/2025 Bedtime  Yes Yes   Sig: Take 10 mg by mouth at bedtime.   apixaban ANTICOAGULANT (ELIQUIS) 5 MG tablet 3/22/2025 Bedtime  Yes Yes   Sig: Take 5 mg by mouth 2 times daily. Left message to stop on 3/23/25 before surgery.   atorvastatin (LIPITOR) 40 MG tablet 3/25/2025 Bedtime  Yes Yes   Sig: Take 40 mg by mouth at bedtime.   docusate sodium (COLACE) 100 MG capsule More than a month  No Yes   Sig: [DOCUSATE SODIUM (COLACE) 100 MG CAPSULE] Take 1 capsule (100 mg total) by mouth 2 (two) times a day.   losartan (COZAAR) 100 MG tablet 3/26/2025 Morning  Yes Yes   Sig: [LOSARTAN (COZAAR) 100 MG TABLET] Take 100 mg by mouth daily.   meloxicam (MOBIC) 7.5 MG tablet 3/19/2025  Yes Yes   Sig: Take 7.5 mg by mouth daily.   metFORMIN (GLUCOPHAGE) 500 MG tablet 3/26/2025 Morning  Yes Yes   Sig: Take 500 mg by mouth 2 times daily (with meals).   polyethylene glycol (MIRALAX) 17 gram packet More than a month  No Yes   Sig: [POLYETHYLENE GLYCOL (MIRALAX) 17 GRAM PACKET] Take 1 packet (17 g total) by mouth daily as needed.   venlafaxine (EFFEXOR XR) 150 MG 24 hr capsule 3/26/2025 Morning  Yes Yes   Sig: Take 300 mg by mouth daily.      Facility-Administered Medications: None      Review of Systems     A 12 point comprehensive review of systems was negative except as noted above in HPI.    OBJECTIVE         Physical Exam   Temp:  [97.8  F (36.6  C)-97.9  F (36.6  C)] 97.8  F (36.6  C)  Pulse:  [75-84] 79  Resp:  [18-32] 20  BP: (114-177)/(61-84) 176/82  SpO2:  [95 %-100 %] 95 %  Body mass index is 34.17 kg/m .  Constitutional: awake, alert, cooperative, no apparent distress, and appears stated  "age  Eyes: Lids and lashes normal, pupils equal, round and reactive to light, extra ocular muscles intact, sclera clear, conjunctiva normal  ENT: Normocephalic, without obvious abnormality, atraumatic, sinuses nontender on palpation, external ears without lesions, oral pharynx with moist mucous membranes, tonsils without erythema or exudates, gums normal and good dentition.  Hematologic / Lymphatic: no cervical lymphadenopathy and no supraclavicular lymphadenopathy  Respiratory: No increased work of breathing, good air exchange, clear to auscultation bilaterally, no crackles or wheezing  Cardiovascular: Normal apical impulse, regular rate and rhythm, normal S1 and S2, no S3 or S4, and no murmur noted  GI: No scars, normal bowel sounds, soft, non-distended, non-tender, no masses palpated, no hepatosplenomegally  Skin: normal skin color, texture, turgor, no redness, warmth, or swelling, and no rashes  Musculoskeletal: Limited exam due to postoperative status but has good function and sensation at the toes bilaterally.  Pulses are strong and equal at the dorsalis pedis and posterior tibialis. no lower extremity pitting edema present  Neurologic: Cranial nerves II-XII are grossly intact. Sensory:  Sensory intact  Neuropsychiatric: General: normal, calm, and normal eye contact Level of consciousness: alert / normal Affect: normal Orientation: oriented to self, place, time and situation Memory and insight: normal, memory for past and recent events intact, and thought process normal        Imaging Reviewed Personally By Myself      Radiology Results:   Recent Results (from the past 24 hours)   POC US Guidance Needle Placement    Narrative    Ultrasound was performed as guidance to an anesthesia procedure.  Click   \"PACS images\" hyperlink below to view any stored images.  For specific   procedure details, view procedure note authored by anesthesia.   XR Knee Port Left 1/2 Views    Narrative    EXAM: XR KNEE PORT LEFT 1/2 " "VIEWS  LOCATION: [facility]  DATE: 3/26/2025    INDICATION: Post Op Total Knee  COMPARISON: None.      Impression    IMPRESSION: Postoperative changes of left total knee arthroplasty with patellar resurfacing. The hardware is in expected alignment. No periprosthetic fracture. Postsurgical gas within and about the joint space.       Labs Reviewed Personally By Myself     Results for orders placed or performed during the hospital encounter of 03/26/25 (from the past 24 hours)   POC US Guidance Needle Placement    Narrative    Ultrasound was performed as guidance to an anesthesia procedure.  Click   \"PACS images\" hyperlink below to view any stored images.  For specific   procedure details, view procedure note authored by anesthesia.   Glucose by meter   Result Value Ref Range    GLUCOSE BY METER POCT 91 70 - 99 mg/dL   Glucose by meter   Result Value Ref Range    GLUCOSE BY METER POCT 114 (H) 70 - 99 mg/dL   XR Knee Port Left 1/2 Views    Narrative    EXAM: XR KNEE PORT LEFT 1/2 VIEWS  LOCATION: [facility]  DATE: 3/26/2025    INDICATION: Post Op Total Knee  COMPARISON: None.      Impression    IMPRESSION: Postoperative changes of left total knee arthroplasty with patellar resurfacing. The hardware is in expected alignment. No periprosthetic fracture. Postsurgical gas within and about the joint space.       Preoperative Labs Reviewed Personally By Myself          (ABNORMAL) Creatinine / GFR (03/19/2025 11:08 AM CDT)  Results - (ABNORMAL) Creatinine / GFR (03/19/2025 11:08 AM CDT)  Component Value Ref Range Test Method Analysis Time Performed At Pathologist [facility]   Creatinine 0.70 (L) 0.73 - 1.18 mg/dL   03/19/2025 4:35 PM CDT [facility] C[facility]TRAL [facility]     GFR, Estimated >60 >60 mL/min/1.73m2   03/19/2025 4:35 PM CDT [facility]       Results - (ABNORMAL) Creatinine / GFR (03/19/2025 11:08 AM CDT)  Specimen (Source) Anatomical Location / Laterality " Collection Method / Volume Collection Time Received Time   Blood   Venipuncture / Unknown 03/19/2025 11:08 AM CDT 03/19/2025 11:08 AM CDT     Results - (ABNORMAL) Creatinine / GFR (03/19/2025 11:08 AM CDT)  Narrative         Results - (ABNORMAL) Creatinine / GFR (03/19/2025 11:08 AM CDT)  Authorizing Provider Result Type Result Status   Tswjfwm S Kumar DO LAB_1 Final Result     Results - (ABNORMAL) Creatinine / GFR (03/19/2025 11:08 AM CDT)  Performing Organization Address City/State/ZIP Code Phone Number   [facility]  [address] [place] [address], [facility]         Back to top of Results       (ABNORMAL) Complete Blood Count-No Diff (03/19/2025 11:08 AM CDT)  Results - (ABNORMAL) Complete Blood Count-No Diff (03/19/2025 11:08 AM CDT)  Component Value Ref Range Test Method Analysis Time Performed At Pathologist [facility]   WBC 7.1 3.5 - 10.5 x10(9)/L   03/19/2025 11:20 AM CDT [facility]     RBC 3.91 (L) 4.32 - 5.72 x10(12)/L   03/19/2025 11:20 AM T [facility]     Hemoglobin 12.3 (L) 13.5 - 17.5 g/dL   03/19/2025 11:20 AM T [facility]     HCT 36.2 (L) 38.8 - 50.0 %   03/19/2025 11:20 AM T [facility]     MCV 92.6 80.0 - 100.0 fL   03/19/2025 11:20 AM CDT [facility]     MCH 31.5 27.6 - 33.3 pg   03/19/2025 11:20 AM CDT [facility]     MCHC 34.0 31.5 - 35.2 g/dL   03/19/2025 11:20 AM T [facility]     RDW 13.3 11.9 - 15.5 %   03/19/2025 11:20 AM T [facility]     Platelets 244 150 - 450 x10(9)/L   03/19/2025 11:20 AM T [facility]       Results - (ABNORMAL) Complete Blood Count-No Diff (03/19/2025 11:08 AM CDT)  Specimen (Source) Anatomical Location / Laterality Collection Method / Volume Collection Time Received Time   Blood   Venipuncture / Unknown 03/19/2025 11:08 AM CDT 03/19/2025 11:08 AM CDT     Results - (ABNORMAL) Complete Blood Count-No Diff (03/19/2025 11:08 AM CDT)  Narrative         Results - (ABNORMAL) Complete Blood Count-No Diff  (03/19/2025 11:08 AM CDT)  Authorizing Provider Result Type Result Status   Luz Marinawjzoe Kumar DO LAB_1 Final Result     Results - (ABNORMAL) Complete Blood Count-No Diff (03/19/2025 11:08 AM CDT)  Performing Organization Address City/State/ZIP Code Phone Number   [facility]  [address] [place] [address], [facility]         Back to top of Results       Hgb A1C (03/19/2025 11:08 AM CDT)  Results - Hgb A1C (03/19/2025 11:08 AM CDT)  Component Value Ref Range Test Method Analysis Time Performed At [facility]ologist [facility]   Hemoglobin A1C 5.1 <=5.6 %   03/19/2025 6:07 PM CDT [facility]     Estimated Average Glucose (Calc) 100 < 117 mg/dL   03/19/2025 6:07 PM CDT [facility]     Comment: Estimated average glucose (eAG) converts A1c into glucose units (mg/dL) and estimates average glucose over the past approximately 3 months. The eAG reference interval (<117 mg/dL) corresponds to an A1c of <5.7%.     Results - Hgb A1C (03/19/2025 11:08 AM CDT)  Specimen (Source) Anatomical Location / Laterality Collection Method / Volume Collection Time Received Time   Blood   Venipuncture / Unknown 03/19/2025 11:08 AM CDT 03/19/2025 11:08 AM CDT     Results - Hgb A1C (03/19/2025 11:08 AM CDT)  Narrative         Results - Hgb A1C (03/19/2025 11:08 AM CDT)  Authorizing Provider Result Type Result Status   Charissa Kumar DO LAB_1 Final Result     Results - Hgb A1C (03/19/2025 11:08 AM CDT)  Performing Organization Address City/State/ZIP Code Phone Number   [facility]  [address] [address], [facility]         Back to top of Results       (ABNORMAL) Sodium (03/19/2025 11:08 AM CDT)  Results - (ABNORMAL) Sodium (03/19/2025 11:08 AM CDT)  Component Value Ref Range Test Method Analysis Time Performed At [facility]ologist [facility]   Sodium 133 (L) 136 - 145 mmol/L   03/19/2025 4:35 PM CDT [facility]       Results - (ABNORMAL) Sodium (03/19/2025 11:08 AM  CDT)  Specimen (Source) Anatomical Location / Laterality Collection Method / Volume Collection Time Received Time   Blood   Venipuncture / Unknown 03/19/2025 11:08 AM CDT 03/19/2025 11:08 AM CDT     Results - (ABNORMAL) Sodium (03/19/2025 11:08 AM CDT)  Narrative         Results - (ABNORMAL) Sodium (03/19/2025 11:08 AM CDT)  Authorizing Provider Result Type Result Status   Charissa Kumar DO LAB_1 Final Result     Results - (ABNORMAL) Sodium (03/19/2025 11:08 AM CDT)  Performing Organization Address City/[facility] Code Phone Number   [facility]  [address] [address], [facility]         Back to top of Results       Potassium (03/19/2025 11:08 AM CDT)  Results - Potassium (03/19/2025 11:08 AM CDT)  Component Value Ref Range Test Method Analysis Time Performed At Pathologist [facility]   Potassium 4.2 3.5 - 5.1 mmol/L   03/19/2025 4:35 PM CDT [facility]       Results - Potassium (03/19/2025 11:08 AM CDT)  Specimen (Source) Anatomical Location / Laterality Collection Method / Volume Collection Time Received Time   Blood   Venipuncture / Unknown 03/19/2025 11:08 AM CDT 03/19/2025 11:08 AM CDT     Results - Potassium (03/19/2025 11:08 AM CDT)  Narrative         Results - Potassium (03/19/2025 11:08 AM CDT)  Authorizing Provider Result Type Result Status   Charissa Kumar DO LAB_1 Final Result     Results - Potassium (03/19/2025 11:08 AM CDT)  Performing Organization Address City/[facility]/ZIP Code Phone Number   [facility]  [address] [address], [facility]         Back to top of Results       Lipid Panel and Direct LDL (If Needed) (06/04/2024 9:59 AM CDT)  Results - Lipid Panel and Direct LDL (If Needed) (06/04/2024 9:59 AM CDT)  Component Value Ref Range Test Method Analysis Time Performed At Pathologist [facility]   Cholesterol 127 0 - 199 mg/dL   06/04/2024 6:52 PM CDT [facility]     Triglyceride 83 <=149 mg/dL   06/04/2024 6:52 PM  CDT [facility]     HDL Cholesterol 42 >=40 mg/dL   06/04/2024 6:52 PM CDT [facility]     LDL, Calculated 68 <130 mg/dL   06/04/2024 6:52 PM CDT [facility]     Non HDL Chol, Calculated 85 <=159 mg/dL   06/04/2024 6:52 PM CDT [facility]     Cholesterol/HDL Ratio 3.0 <=5.0   06/04/2024 6:52 PM T [facility]     Hours Fasting 11.0 8 - 12 Hours   06/04/2024 6:52 PM CDT [facility]       Results - Lipid Panel and Direct LDL (If Needed) (06/04/2024 9:59 AM CDT)  Specimen (Source) Anatomical Location / Laterality Collection Method / Volume Collection Time Received Time   Blood   Venipuncture / Unknown 06/04/2024 9:59 AM CDT 06/04/2024 9:59 AM CDT           Thank you for this consultation.  Appreciate the opportunity to participate in the care of Kendall Mendez, please feel free to contact us for any questions or concerns.    Jaden Panchal MD  [facility]  Phone: #[phone]

## 2025-03-26 NOTE — ANESTHESIA CARE TRANSFER NOTE
Patient: Kendall Mendez    Procedure: Procedure(s):  LEFT TOTAL KNEE ARTHROPLASTY       Diagnosis: Osteoarthritis of left knee [M17.12]  Diagnosis Additional Information: No value filed.    Anesthesia Type:   Spinal     Note:    Oropharynx: oropharynx clear of all foreign objects  Level of Consciousness: awake  Oxygen Supplementation: face mask  Level of Supplemental Oxygen (L/min / FiO2): 8  Independent Airway: airway patency satisfactory and stable  Dentition: dentition unchanged  Vital Signs Stable: post-procedure vital signs reviewed and stable  Report to RN Given: handoff report given  Patient transferred to: PACU    Handoff Report: Identifed the Patient, Identified the Reponsible Provider, Reviewed the pertinent medical history, Discussed the surgical course, Reviewed Intra-OP anesthesia mangement and issues during anesthesia, Set expectations for post-procedure period and Allowed opportunity for questions and acknowledgement of understanding      Vitals:  Vitals Value Taken Time   /61 03/26/25 1400   Temp 36.6  C (97.8  F) 03/26/25 1400   Pulse 84 03/26/25 1400   Resp 18 03/26/25 1400   SpO2 100 % 03/26/25 1400       Electronically Signed By: MIGDALIA Wong CRNA  March 26, 2025  2:02 PM

## 2025-03-26 NOTE — ANESTHESIA PREPROCEDURE EVALUATION
Anesthesia Pre-Procedure Evaluation    Patient: Kendall Mendez   MRN: [id number] : 1955        Procedure : Procedure(s):  LEFT TOTAL KNEE ARTHROPLASTY          Past Medical History:   Diagnosis Date     Antiplatelet or antithrombotic long-term use      Arrhythmia      Arthritis      Cerebral artery occlusion with cerebral infarction (H)      Diabetes (H)      Hypertension      Sleep apnea       Past Surgical History:   Procedure Laterality Date     ARTHROPLASTY REVISION HIP Right 10/4/2017    Procedure: RESECTION OF  ARTHROPLASTY AND REPLACEMENT WITH ANTIBIOTIC SPACERS AND IRRIGATION AND DEBRIDEMENT, RIGHT HIP;  Surgeon: Yaakov Solares MD;  Location: [facility];  Service:      PICC AND MIDLINE TEAM LINE INSERTION  10/5/2017           Allergies   Allergen Reactions     Levaquin [Levofloxacin] Other (See Comments)     Diffuse joint pain, bad enough that he had take time off work. Resolved after discontinuation of Levaquin.      Lisinopril Cough      Social History     Tobacco Use     Smoking status: Former     Current packs/day: 0.00     Types: Cigarettes     Quit date: 10/4/1990     Years since quittin.4     Smokeless tobacco: Never   Substance Use Topics     Alcohol use: Not Currently     Comment: Sober 30 years      Wt Readings from Last 1 Encounters:   25 111.1 kg (245 lb)        Anesthesia Evaluation   Pt has had prior anesthetic.         ROS/MED HX  ENT/Pulmonary:  - neg pulmonary ROS   (+) sleep apnea, severe, uses CPAP,                                      Neurologic:     (+)          CVA,                      Cardiovascular:     (+)  hypertension- -   -  - -   Taking blood thinners                     dysrhythmias, a-fib,             METS/Exercise Tolerance:     Hematologic:  - neg hematologic  ROS     Musculoskeletal:   (+)  arthritis,             GI/Hepatic:  - neg GI/hepatic ROS     Renal/Genitourinary:  - neg Renal ROS     Endo:     (+)  type II DM,             Obesity,   "     Psychiatric/Substance Use:  - neg psychiatric ROS     Infectious Disease:  - neg infectious disease ROS     Malignancy:  - neg malignancy ROS     Other:  - neg other ROS        Physical Exam    Airway  airway exam normal      Mallampati: II   TM distance: > 3 FB   Neck ROM: full   Mouth opening: > 3 cm    Respiratory Devices and Support         Dental  no notable dental history     (+) Multiple visibly decayed, broken teeth      Cardiovascular   cardiovascular exam normal       Rhythm and rate: regular and normal     Pulmonary   pulmonary exam normal        breath sounds clear to auscultation       OUTSIDE LABS:  CBC: No results found for: \"WBC\", \"HGB\", \"HCT\", \"PLT\"  BMP:   Lab Results   Component Value Date    GLC 91 03/26/2025     COAGS: No results found for: \"PTT\", \"INR\", \"FIBR\"  POC: No results found for: \"BGM\", \"HCG\", \"HCGS\"  HEPATIC: No results found for: \"ALBUMIN\", \"PROTTOTAL\", \"ALT\", \"AST\", \"GGT\", \"ALKPHOS\", \"BILITOTAL\", \"BILIDIRECT\", \"VAIBHAV\"  OTHER: No results found for: \"PH\", \"LACT\", \"A1C\", \"MARGO\", \"PHOS\", \"MAG\", \"LIPASE\", \"AMYLASE\", \"TSH\", \"T4\", \"T3\", \"CRP\", \"SED\"    Anesthesia Plan    ASA Status:  3    NPO Status:  NPO Appropriate    Anesthesia Type: Spinal.   Induction: N/a.           Consents    Anesthesia Plan(s) and associated risks, benefits, and realistic alternatives discussed. Questions answered and patient/representative(s) expressed understanding.     - Discussed:     - Discussed with:  Patient      - Extended Intubation/Ventilatory Support Discussed: No.      - Patient is DNR/DNI Status: No     Use of blood products discussed: No .     Postoperative Care    Pain management: Multi-modal analgesia, Peripheral nerve block (Single Shot).   PONV prophylaxis: Ondansetron (or other 5HT-3), Dexamethasone or Solumedrol     Comments:    Other Comments: ADC block for POA per surgeon request.         Nadeem Mayer MD    Clinically Significant Risk Factors Present on Admission                # " "Drug Induced Coagulation Defect: home medication list includes an anticoagulant medication    # Hypertension: Noted on problem list           # Obesity: Estimated body mass index is 34.17 kg/m  as calculated from the following:    Height as of this encounter: 1.803 m (5' 11\").    Weight as of this encounter: 111.1 kg (245 lb).                "

## 2025-03-26 NOTE — INTERVAL H&P NOTE
"I have reviewed the surgical (or preoperative) H&P that is linked to this encounter, and examined the patient. There are no significant changes    Clinical Conditions Present on Arrival:  Clinically Significant Risk Factors Present on Admission                 # Drug Induced Coagulation Defect: home medication list includes an anticoagulant medication       # Obesity: Estimated body mass index is 34.17 kg/m  as calculated from the following:    Height as of this encounter: 1.803 m (5' 11\").    Weight as of this encounter: 111.1 kg (245 lb).       "

## 2025-03-26 NOTE — ANESTHESIA PROCEDURE NOTES
"Intrathecal injection Procedure Note    Pre-Procedure   Staff -        Anesthesiologist:  Nadeem Mayer MD       Performed By: anesthesiologist       Location: OR       Procedure Start/Stop Times: 3/26/2025 11:56 AM and 3/26/2025 12:00 PM       Pre-Anesthestic Checklist: patient identified, IV checked, risks and benefits discussed, informed consent, monitors and equipment checked and pre-op evaluation  Timeout:       Correct Patient: Yes        Correct Procedure: Yes        Correct Site: Yes        Correct Position: Yes   Procedure Documentation  Procedure: intrathecal injection         Patient Position: sitting       Patient Prep/Sterile Barriers: sterile gloves, mask, patient draped       Skin prep: Chloraprep       Insertion Site: L3-4. (left paramedian approach).       Needle Gauge: 24.        Needle Length (Inches): 4        Spinal Needle Type: Pencan       Introducer used       Introducer: 20 G       # of attempts: 2 and  # of redirects:  1  Medication(s) Administered   0.75% Hyperbaric Bupivacaine (Intrathecal) - Intrathecal   2 mL - 3/26/2025 12:00:00 PM  Medication Administration Time: 3/26/2025 11:56 AM      FOR [facility] ([facility] [place]) ONLY:   Pain Team Contact information: please page the Pain Team [email or web address]. Search \"Pain\". During daytime hours, please page the attending first. At night please page the resident first.      "

## 2025-03-26 NOTE — OP NOTE
DATE OF SERVICE: 3/26/2025     PREOPERATIVE DIAGNOSIS: Osteoarthritis of left knee [M17.12]     POSTOPERATIVE DIAGNOSIS: Same    PROCEDURE: Procedure(s):  LEFT TOTAL KNEE ARTHROPLASTY     IMPLANTS:   Implant Name Type Inv. Item Serial No.  Lot No. LRB No. Used Action   PIN FIXATION SS FLUTE SQUARE L3.5 IN OD1/8 IN [id number] - [id number] Metallic Hardware/[place] PIN FIXATION SS FLUTE SQUARE L3.5 IN OD1/8 IN [id number]  [employer] [id number] Left 1 Used as a Supply   BONE CEMENT SIMPLEX FULL DOSE 6191-1-001 - [id number] Cement, Bone BONE CEMENT SIMPLEX FULL DOSE 6191-1-001  [employer]  Left 1 Implanted   IMP COMP PATELLA HOWM TRI 38 10MM 5551-L-381 - [id number] Total Joint Component/Insert IMP COMP PATELLA HOWM TRI 38 10MM 5551-L-381  [employer] [id number] Left 1 Implanted   IMP INSERT TIBIAL STRK TRI 7X09MM 2571-K-[id number] - [id number] Total Joint Component/Insert IMP INSERT TIBIAL STRK TRI 7X09MM 5554-Y-[id number]  [employer] [id number] Left 1 Implanted   IMP BASEPLATE TIBIAL STRK TRIATHLN KNEE SZ 7 5536-B-700 - [id number] Total Joint Component/Insert IMP BASEPLATE TIBIAL STRK TRIATHLN KNEE SZ 7 5536-B-700  [employer] [id number] Left 1 Implanted   IMP COMP FEM STRK TRIATHLN CR BD W/PA LT 6 5517-F-601 - [id number] Total Joint Component/Insert IMP COMP FEM STRK TRIATHLN CR BD W/PA LT 6 5517-F-601  [employer] [id number] Left 1 Implanted        FINDINGS: Severe tricompartmental degenerative arthritis with most severe involvement of the lateral and patellofemoral compartment.  10 degree flexion contracture.    SURGEON: DANIEL STODDARD MD     ASSISTANT: Zoila Thomas PA-C  The assistant was necessary for patient safety including positioning, retraction, instrumentation, placement of implants, wound closure and dressing application.    INDICATIONS: The patient is a 69-year-old normally healthy individual whose had persistent progressively worsening left knee pain present for a  number of years.  Despite a long course of nonoperative therapy failed to improve.  After having reviewed the risk and benefits of different treatment options patient elected proceed with surgical intervention specifically a left total knee arthroplasty.  The normal postop course was discussed in detail preoperatively.    PROCEDURE: After informed written consent was obtained, the patient underwent successful placement of a left knee block and was brought to the OR where they underwent successful placement of a Spinal with Block.  The patient received IV antibiotics.  The leg was sterilely prepped and draped in usual fashion.  After a pause for the cause or timeout with identification of the operative limb, a tourniquet was inflated to 225 mmHg after exsanguination and an [place]an drape was applied.  The surgical team were operative hoods.    Direct midline approach to the knee was made with sharp dissection being carried down through the skin and subcutaneous tissue and a medial parapatellar approach was performed.  An effusion was noted.  There was synovitis.  There was significant degenerative change noted with full loss of articular cartilage as well as osteophytic changes apparent.  There was a 10 degree flexion contracture noted.    A rongeur was used to remove osteophytes.  A guide chante was gently advanced up the femur and the distal femoral cut made without difficulty.  The femur was sized to the appropriate component with the anterior posterior and chamfer  cuts being made without difficulty.  A line to line fit with the trial component was noted.  Because of the excellent femoral bone quality encountered we elected to proceed with a press-fit or beaded femoral component.    Attention was then directed towards the tibia.  The tibia was cut referenced off the deficient tibial plateau.  The tibia was sized to the appropriate component.  Similarly because of the excellent tibial bone quality encountered we elected  to proceed with a TriTanium or ingrowth tibial baseplate.  The appropriate spacer was then applied with full extension being noted as well as excellent flexion.  Good varus valgus stability was appreciated at both 0 and 30 .    The patella was then measured with a caliper cut and sized to the appropriate component.  Midline patellar tracking was appreciated.    The rotation was determined off the trial components.  Meniscal and ACL remnants were debrided.  The  pericapsular tissues were injected with an analgesic anesthetic mix.  Care was taken to avoid the neurovascular  throughout the injection procedure as well as the cutting of both the femur and the tibia.    The wing punch as well as peg drill were used for tibial prep.  Copious jet lavage with an additional liter of fluid was performed.  At this point impaction of the tibia was followed by placement of the spacer then the femur then cementing of the patella.  All components were held under compression during cement hardening and all extra cement was meticulously removed.    Range of motion tracking and stability were unchanged post fixation.  At this point the tourniquet was let down and hemostasis was achieved using electrocautery.  1 L of dilute sterile Betadine solution was used to irrigate the joint for 3 minutes time.  Further irrigation was then followed by closure with the knee flexed at 90  with multiple #1 Vicryl reinforced with 0 Vicryl as well as strata fix suture subcutaneous tissue with 2-0 Vicryl and skin with Monocryl and Exofin.  A sterile dressing was applied.  The patient tolerated procedure the procedure well.  There were no known complications.  Sponge and needle counts were reported as correct ×2.  The patient the patient was transferred to [facility] for recovery.  Estimated blood loss was 50 cc.    Obed Brantley MD

## 2025-03-27 ENCOUNTER — APPOINTMENT (OUTPATIENT)
Dept: OCCUPATIONAL THERAPY | Facility: CLINIC | Age: 70
End: 2025-03-27
Attending: ORTHOPAEDIC SURGERY
Payer: COMMERCIAL

## 2025-03-27 ENCOUNTER — APPOINTMENT (OUTPATIENT)
Dept: PHYSICAL THERAPY | Facility: CLINIC | Age: 70
End: 2025-03-27
Attending: ORTHOPAEDIC SURGERY
Payer: COMMERCIAL

## 2025-03-27 VITALS
DIASTOLIC BLOOD PRESSURE: 56 MMHG | HEART RATE: 88 BPM | WEIGHT: 245 LBS | SYSTOLIC BLOOD PRESSURE: 111 MMHG | RESPIRATION RATE: 18 BRPM | BODY MASS INDEX: 34.3 KG/M2 | HEIGHT: 71 IN | OXYGEN SATURATION: 95 % | TEMPERATURE: 98.5 F

## 2025-03-27 LAB
ATRIAL RATE - MUSE: 96 BPM
DIASTOLIC BLOOD PRESSURE - MUSE: NORMAL MMHG
FASTING STATUS PATIENT QL REPORTED: NO
GLUCOSE BLDC GLUCOMTR-MCNC: 118 MG/DL (ref 70–99)
GLUCOSE BLDC GLUCOMTR-MCNC: 125 MG/DL (ref 70–99)
GLUCOSE SERPL-MCNC: 110 MG/DL (ref 70–99)
HGB BLD-MCNC: 9.8 G/DL (ref 13.3–17.7)
INTERPRETATION ECG - MUSE: NORMAL
P AXIS - MUSE: 20 DEGREES
PR INTERVAL - MUSE: 180 MS
QRS DURATION - MUSE: 92 MS
QT - MUSE: 346 MS
QTC - MUSE: 437 MS
R AXIS - MUSE: -24 DEGREES
SYSTOLIC BLOOD PRESSURE - MUSE: NORMAL MMHG
T AXIS - MUSE: 38 DEGREES
VENTRICULAR RATE- MUSE: 96 BPM

## 2025-03-27 PROCEDURE — 258N000003 HC RX IP 258 OP 636: Performed by: FAMILY MEDICINE

## 2025-03-27 PROCEDURE — 99215 OFFICE O/P EST HI 40 MIN: CPT | Performed by: FAMILY MEDICINE

## 2025-03-27 PROCEDURE — 97116 GAIT TRAINING THERAPY: CPT | Mod: GP

## 2025-03-27 PROCEDURE — 97166 OT EVAL MOD COMPLEX 45 MIN: CPT | Mod: GO

## 2025-03-27 PROCEDURE — 97161 PT EVAL LOW COMPLEX 20 MIN: CPT | Mod: GP

## 2025-03-27 PROCEDURE — 97535 SELF CARE MNGMENT TRAINING: CPT | Mod: GO

## 2025-03-27 PROCEDURE — 93010 ELECTROCARDIOGRAM REPORT: CPT | Performed by: INTERNAL MEDICINE

## 2025-03-27 PROCEDURE — 82947 ASSAY GLUCOSE BLOOD QUANT: CPT | Performed by: ORTHOPAEDIC SURGERY

## 2025-03-27 PROCEDURE — 85018 HEMOGLOBIN: CPT | Performed by: PHYSICIAN ASSISTANT

## 2025-03-27 PROCEDURE — 250N000011 HC RX IP 250 OP 636: Performed by: PHYSICIAN ASSISTANT

## 2025-03-27 PROCEDURE — 250N000013 HC RX MED GY IP 250 OP 250 PS 637: Performed by: PHYSICIAN ASSISTANT

## 2025-03-27 PROCEDURE — 82962 GLUCOSE BLOOD TEST: CPT

## 2025-03-27 PROCEDURE — 250N000013 HC RX MED GY IP 250 OP 250 PS 637: Performed by: FAMILY MEDICINE

## 2025-03-27 PROCEDURE — 36415 COLL VENOUS BLD VENIPUNCTURE: CPT | Performed by: PHYSICIAN ASSISTANT

## 2025-03-27 PROCEDURE — 93005 ELECTROCARDIOGRAM TRACING: CPT

## 2025-03-27 PROCEDURE — 97110 THERAPEUTIC EXERCISES: CPT | Mod: GP

## 2025-03-27 RX ORDER — MAGNESIUM HYDROXIDE/ALUMINUM HYDROXICE/SIMETHICONE 120; 1200; 1200 MG/30ML; MG/30ML; MG/30ML
15 SUSPENSION ORAL ONCE
Status: COMPLETED | OUTPATIENT
Start: 2025-03-27 | End: 2025-03-27

## 2025-03-27 RX ORDER — OXYCODONE HYDROCHLORIDE 5 MG/1
5-10 TABLET ORAL EVERY 4 HOURS PRN
Qty: 26 TABLET | Refills: 0 | Status: SHIPPED | OUTPATIENT
Start: 2025-03-27

## 2025-03-27 RX ORDER — CEFADROXIL 500 MG/1
500 CAPSULE ORAL 2 TIMES DAILY
Qty: 14 CAPSULE | Refills: 0 | Status: SHIPPED | OUTPATIENT
Start: 2025-03-27

## 2025-03-27 RX ORDER — HYDROXYZINE HYDROCHLORIDE 10 MG/1
10 TABLET, FILM COATED ORAL EVERY 6 HOURS PRN
Qty: 30 TABLET | Refills: 0 | Status: SHIPPED | OUTPATIENT
Start: 2025-03-27

## 2025-03-27 RX ADMIN — SENNOSIDES AND DOCUSATE SODIUM 1 TABLET: 50; 8.6 TABLET ORAL at 08:47

## 2025-03-27 RX ADMIN — VENLAFAXINE HYDROCHLORIDE 300 MG: 150 CAPSULE, EXTENDED RELEASE ORAL at 08:47

## 2025-03-27 RX ADMIN — OXYCODONE 5 MG: 5 TABLET ORAL at 11:57

## 2025-03-27 RX ADMIN — SODIUM CHLORIDE 250 ML: 0.9 INJECTION, SOLUTION INTRAVENOUS at 11:16

## 2025-03-27 RX ADMIN — ALUMINUM HYDROXIDE, MAGNESIUM HYDROXIDE, AND SIMETHICONE 15 ML: 1200; 120; 1200 SUSPENSION ORAL at 11:22

## 2025-03-27 RX ADMIN — ACETAMINOPHEN 975 MG: 325 TABLET ORAL at 01:05

## 2025-03-27 RX ADMIN — APIXABAN 5 MG: 5 TABLET, FILM COATED ORAL at 08:47

## 2025-03-27 RX ADMIN — ACETAMINOPHEN 975 MG: 325 TABLET ORAL at 11:22

## 2025-03-27 RX ADMIN — OXYCODONE 5 MG: 5 TABLET ORAL at 06:31

## 2025-03-27 RX ADMIN — CEFAZOLIN SODIUM 2 G: 2 SOLUTION INTRAVENOUS at 04:26

## 2025-03-27 RX ADMIN — METFORMIN HYDROCHLORIDE 500 MG: 500 TABLET, FILM COATED ORAL at 08:47

## 2025-03-27 RX ADMIN — OXYCODONE 5 MG: 5 TABLET ORAL at 02:21

## 2025-03-27 ASSESSMENT — ACTIVITIES OF DAILY LIVING (ADL)
ADLS_ACUITY_SCORE: 24

## 2025-03-27 NOTE — PLAN OF CARE
Patient vital signs are at baseline: Yes  Patient able to ambulate as they were prior to admission or with assist devices provided by therapies during their stay:  Yes  Patient MUST void prior to discharge:  Yes  Patient able to tolerate oral intake:  Yes  Pain has adequate pain control using Oral analgesics:  Yes  Does patient have an identified :  Yes  Has goal D/C date and time been discussed with patient:  Yes    BP lower this morning. IV bolus given. BP improved and patient denies dizziness. Discharge to home.

## 2025-03-27 NOTE — CONSULTS
CARE MANAGEMENT NOTE:    Care management team consulted for discharge planning. Care manager reviewed patients chart and discussed patient at unit rounds. No discharge needs identified at this time.     No further care management intervention anticipated at this time.  Please re-consult if further needs arise.  Care management signing off.      Florence Lopez RN  3/27/2025 11:42 AM

## 2025-03-27 NOTE — PLAN OF CARE
Patient vital signs are at baseline: Yes, has CPAP at bedtime  Patient able to ambulate as they were prior to admission or with assist devices provided by therapies during their stay:  Yes  Patient MUST void prior to discharge:  No,  tried to void, bladder scanned 288ml  Patient able to tolerate oral intake:  Yes  Pain has adequate pain control using Oral analgesics:  Yes  Does patient have an identified :  Yes  Has goal D/C date and time been discussed with patient:  Yes    Patient alert and oriented. VSS. RA. Has CPAP set up. LR infusing at 125ml/hr.  Tolerating Regular diet. ACHS checks done. Ambbulated. A1 with walker and gait belt. Tried to void but can't. Bladder scanned for 288ml. PRN oxycodone x2 given for pain along with ice packs and repositioning. Call light within reach. Alarms on.

## 2025-03-27 NOTE — DISCHARGE SUMMARY
ORTHOPEDIC DISCHARGE SUMMARY       Kendall Mendez,  1955, MRN [id number]    Admission Date: 3/26/2025      Admission Diagnoses: Osteoarthritis of left knee [M17.12]     Discharge Date:  3/27/2025    Post-operative Day:  1 Day Post-Op    Reason for Admission: The patient was admitted for the following: Procedure(s):  LEFT TOTAL KNEE ARTHROPLASTY    BRIEF HOSPITAL COURSE   Kendall Mendez is a pleasant 69 year old male who underwent the aforementioned procedure with Dr. Brantley on 3/26/25. There were no intraoperative complications and the patient was transferred to the recovery room and later the orthopedic unit in stable condition. Once the patient reached the orthopedic floor our orthopedic pain protocol was implemented along with the following:    Anticoagulation Medications: Eliquis  Therapy: PT and OT  Activity: WBAT  Bracing: None    Consultations during Admission: Hospitalist service for medical management     COMPLICATIONS/SIGNIFICANT FINDINGS    FINDINGS: Severe tricompartmental degenerative arthritis with most severe involvement of the lateral and patellofemoral compartment.  10 degree flexion contracture.       DISCHARGE INFORMATION   Condition at discharge: Good  Discharge destination: Home  Patient was seen by myself on the date of discharge.    FOLLOW UP CARE   Follow up with orthopedics in 2 weeks or sooner should the need arise. Ortho will continue to manage pain control, post op anticoagulation and incision care.     Follow up with your PCP for management of chronic medical problems and to evaluate for post op medical complications including constipation, nausea/vomiting, DVT/PE, anemia, changes in blood pressure, fevers/chills, urinary retention and atelectasis/pneumonia.     Subjective   Patient is doing well on POD #1. Pain is well controlled with oral medications. Ambulating. Tolerating oral intake.     Physical Exam   /56 (BP Location: Right arm)   Pulse 88   Temp 98.5  F  "(36.9  C) (Oral)   Resp 18   Ht 1.803 m (5' 11\")   Wt 111.1 kg (245 lb)   SpO2 95%   BMI 34.17 kg/m    The patient is A&Ox3. Appears comfortable, sitting in recliner.  Sensation is intact to light touch & equal bilaterally in the L2 through S1 dermatomes.  Calves are soft and non-tender.  Dorsiflexion and plantar flexion is intact bilaterally.  Appropriate flexion and extension of the toes bilaterally.   Brisk capillary refill in the toes bilaterally.   Palpable left dorsalis pedis pulse.  left knee dressing C/D/I.     Pertinent Results at Discharge     Hemoglobin   Date/Time Value Ref Range Status   03/27/2025 08:43 AM 9.8 (L) 13.3 - 17.7 g/dL Final       Problem List   Active Problems:    Hypertension goal BP (blood pressure) < 130/80    Obstructive sleep apnea    Depression, unspecified depression type    S/P total knee arthroplasty, left    Paroxysmal atrial fibrillation (H)    Pre-diabetes      Kat Graham PA-C/Dr. Brantley  [facility]  [phone]  Date: 3/27/2025  Time: 12:02 PM    "

## 2025-03-27 NOTE — PROGRESS NOTES
03/27/25 0905   Appointment Info   Signing Clinician's Name / Credentials (PT) Eliezer Billingsley, PT   Quick Adds   Quick Adds Certification;[facility] Auth & Certification   Living Environment   People in Home child(mj), adult   Current Living Arrangements house   Home Accessibility stairs to enter home;stairs within home   Number of Stairs, Main Entrance 4   Stair Railings, Main Entrance none   Number of Stairs, Within Home, Primary none   Self-Care   Usual Activity Tolerance good   Current Activity Tolerance poor   Equipment Currently Used at Home none   Fall history within last six months no   Activity/Exercise/Self-Care Comment Indep with all I ADL's and ADL   General Information   Onset of Illness/Injury or Date of Surgery 03/27/25   Referring Physician Dr. Brantley   Patient/Family Therapy Goals Statement (PT) to be able to walk without pain   Pertinent History of Current Problem (include personal factors and/or comorbidities that impact the POC) R TKA   Existing Precautions/Restrictions no known precautions/restrictions   Weight-Bearing Status - RLE weight-bearing as tolerated   Cognition   Affect/Mental Status (Cognition) WFL   Range of Motion (ROM)   ROM Comment decreased ROM s/p  R TKA   Strength (Manual Muscle Testing)   Strength (Manual Muscle Testing) No deficits observed during functional mobility   Transfers   Transfers sit-stand transfer   Sit-Stand Transfer   Sit-Stand [place] (Transfers) contact guard;verbal cues   Assistive Device (Sit-Stand Transfers) walker, front-wheeled   Balance   Balance no deficits were identified   Clinical Impression   Criteria for Skilled Therapeutic Intervention Yes, treatment indicated   PT Diagnosis (PT) impaired functional mobility   Influenced by the following impairments pain, decreased ROM, impaired balance, decreased strength   Functional limitations due to impairments gait, stairs, transfers   Clinical Presentation (PT Evaluation Complexity) stable   Clinical  Presentation Rationale pt presents as medically diagnosed   Clinical Decision Making (Complexity) low complexity   Planned Therapy Interventions (PT) gait training;home exercise program;patient/family education;stair training;transfer training   Risk & Benefits of therapy have been explained care plan/treatment goals reviewed;patient   PT Total Evaluation Time   PT [place], Low Complexity Minutes (12263) 10   Therapy Certification   Start of care date 03/27/25   Certification date from 03/27/25   Certification date to 03/31/25   [facility] Authorization Information   Condition type Initial onset (within last 3 months)   Cause of current episode Post Surgical   Nature of treatment Rehabilitative   Functional ability Moderate functional limitations   Documented POC (choose all that apply) Measurable short and long term/discharge treatment goals related to physical and functional deficits.;Frequency of treatment visits and treatment activities to address deficit areas.;Patient agrees to program participation including home program   Briefly describe symptoms sharp stabbing pain in R knee   How did the symptoms start post op   Last 24H: avg pain/symptom intensity  6/10   Past wk: avg pain/symptom intensity 5/10   Frequency of Symptoms Frequently (51-75% of the time)   Symptom impact on ADLs Quite a bit   Condition change since eval No change   General health reported by patient Good   Type of surgery 5-Joint Replacement   Physical Therapy Goals   PT Frequency 2x/day   PT Predicted Duration/Target Date for Goal Attainment 03/31/25   PT Goals PT Goal 1;Transfers;Gait;Stairs   PT: Transfers Modified independent;Sit to/from stand;Assistive device   PT: Gait Modified independent;Rolling walker;100 feet   PT: Stairs 4 stairs;Within precautions;Minimal assist;Rail on right   PT: Goal 1 Independent with written HEP for LE strengthening and ROM   Interventions   Interventions Quick Adds Therapeutic Procedure;Therapeutic Activity;Gait  Training   Therapeutic Procedure/Exercise   Ther. Procedure: strength, endurance, ROM, flexibillity Minutes (88437) 15   Symptoms Noted During/After Treatment none   Treatment Detail/Skilled Intervention TKA protocol therex x10 reps, cueing for technique,   Therapeutic Activity   Therapeutic Activities: dynamic activities to improve functional performance Minutes (00010) 5   Symptoms Noted During/After Treatment Significant change in vital signs  (BP dropped)   Treatment Detail/Skilled Intervention sit to/from stand, cueing for safe hand placement and LE positioning, Mod I following education   PT Discharge Planning   PT Plan progress with gait/transfers as able, review Knee flexion stretch   PT Discharge Recommendation (DC Rec) other (see comments)  (per ortho MD)   PT Rationale for DC Rec Patient having low BP but should do well with mobiltiy once he is medically stable. Has good home setup and support   PT Brief overview of current status Patient cga with tranfers, low BP is limiting factor   PT Total Distance Amb During Session (feet) 0   PT Equipment Needed at Discharge cane, straight;walker, rolling   Physical Therapy Time and Intention   Timed Code Treatment Minutes 20   Total Session Time (sum of timed and untimed services) 30   [facility]                                                                                   OUTPATIENT PHYSICAL THERAPY    PLAN OF TREATMENT FOR OUTPATIENT REHABILITATION   Patient's Last Name, First Name, RICO MendezKendall  F YOB: 1955   Provider's Name   [facility]   Medical Record No.  [id number]     Onset Date: 03/27/25 Start of Care Date: 03/27/25     Medical Diagnosis:                  PT Diagnosis:  impaired functional mobility Certification Dates:  From: 03/27/25  To: (P) 03/31/25       See note for plan of treatment, functional goals, and certification details.    I CERTIFY THE NEED FOR THESE  SERVICES FURNISHED UNDER        THIS PLAN OF TREATMENT AND WHILE UNDER MY CARE (Physician co-signature of this document indicates review and certification of the therapy plan).

## 2025-03-27 NOTE — PROGRESS NOTES
"   03/27/25 0732   Appointment Info   Signing Clinician's Name / Credentials (OT) Blanca Horan, ANA/L, CLT   Rehab Comments (OT) OT antonino   Quick Adds   Quick Adds Certification;[facility] Auth & Certification   Living Environment   People in Home other (see comments)   Home Accessibility stairs to enter home   Number of Stairs, Main Entrance 4   Stair Railings, Main Entrance   (son may put rail in today)   Living Environment Comments duplex with Adult son in Upper level who can check on pt PRN.   Self-Care   Usual Activity Tolerance good   Current Activity Tolerance moderate   Equipment Currently Used at Home other (see comments);raised toilet seat;grab bar, tub/shower  (\"ADA height toilet with sink next to it\"; bought WIS; tub with rails)   Activity/Exercise/Self-Care Comment Pt independent w/ ADL and IADL at baseline.   General Information   Onset of Illness/Injury or Date of Surgery 03/26/25   Referring Physician Dr. Brantley   Patient/Family Therapy Goal Statement (OT) wants to go home   Additional Occupational Profile Info/Pertinent History of Current Problem [age]KA69 year old male with a PMH of paroxysmal A-fib, CVA, chronically on Eliquis, HTN, FRANCESCO on CPAP, depression, alcohol abuse sober since 1994 and borderline diabetes.   Existing Precautions/Restrictions weight bearing   Left Lower Extremity (Weight-bearing Status) weight-bearing as tolerated (WBAT)   Cognitive Status Examination   Orientation Status orientation to person, place and time   Affect/Mental Status (Cognitive) WNL   Visual Perception   Visual Impairment/Limitations WFL   Sensory   Sensory Quick Adds sensation intact   Posture   Posture not impaired   Range of Motion Comprehensive   General Range of Motion no range of motion deficits identified   Strength Comprehensive (MMT)   General Manual Muscle Testing (MMT) Assessment no strength deficits identified   Muscle Tone Assessment   Muscle Tone Quick Adds No deficits were identified   Coordination "   Upper Extremity Coordination No deficits were identified   Bed Mobility   Bed Mobility supine-sit;sit-supine   Comment (Bed Mobility) SBA-CGA   Transfers   Transfers bed-chair transfer;sit-stand transfer;toilet transfer;shower transfer   Transfer Comments SBA-CGA   Sit-Stand Transfer   Sit/Stand Transfer Comments SBA-CGA   Shower Transfer   Shower Transfer Comments SB-CGA   Toilet Transfer   Toilet Transfer Comments [facility]   Balance   Balance Comments decreased   Activities of Daily Living   BADL Assessment/Intervention lower body dressing;toileting   Lower Body Dressing Assessment/Training   [place] Level (Lower Body Dressing) maximum assist (25% patient effort)   Toileting   [place] Level (Toileting) supervision   Clinical Impression   Criteria for Skilled Therapeutic Interventions Met (OT) Yes, treatment indicated   OT Diagnosis decreased ADLs   Influenced by the following impairments TKA   OT Problem List-Impairments impacting ADL activity tolerance impaired;pain;mobility   Assessment of Occupational Performance 3-5 Performance Deficits   Identified Performance Deficits dressing, bathing, functional mobility, toileting   Planned Therapy Interventions (OT) ADL retraining;bed mobility training;transfer training   Clinical Decision Making Complexity (OT) detailed assessment/moderate complexity   Risk & Benefits of therapy have been explained evaluation/treatment results reviewed;patient   OT Total Evaluation Time   OT Eval, Moderate Complexity Minutes (58295) 10   Therapy Certification   Medical Diagnosis TKA   Start of Care Date 03/27/25   Certification date from 03/27/25   [facility] Authorization Information   Condition type Initial onset (within last 3 months)   Cause of current episode Post Surgical   Nature of treatment Rehabilitative   Functional ability Moderate functional limitations   Documented POC (choose all that apply) Frequency of treatment visits and treatment activities to address deficit  areas.;Measurable short and long term/discharge treatment goals related to physical and functional deficits.;Patient agrees to program participation including home program   Symptom impact on ADLs Moderately   Condition change since eval N/A (first visit)   General health reported by patient Good   Type of surgery 5-Joint Replacement   OT Goals   Therapy Frequency (OT) One time eval and treatment   OT Predicted Duration/Target Date for Goal Attainment 03/27/25   OT Goals Lower Body Dressing;Bed Mobility   OT: Lower Body Dressing Modified independent;using adaptive equipment;Goal Met;Completed  (except shoes)   OT: Bed Mobility Modified independent;Goal Met;Completed   Interventions   Interventions Quick Adds Self-Care/Home Management   Self-Care/Home Management   Self-Care/Home Mgmt/ADL, Compensatory, Meal Prep Minutes (57902) 30   Symptoms Noted During/After Treatment (Meal Preparation/Planning Training) increased pain   Treatment Detail/Skilled Intervention Pt edu on compensatory strategies for LE dressing - pt completed Mod I after instruction, except shoes pt needed long shoe horn and Mod-MaxA. STS w/ SBA and amb. ~15 ft to BR w/ FWW, pain w/ mobility. Pt edu on safety technique for toilet/walkin(simulated tub) shower transfer - completed each Mod I. Pt instructed on bed mobility techniques - completed Mod I. Pt edu on sleeping position and car transfers - pt verbalized understanding.   OT Discharge Planning   OT Plan OT d/c   OT Discharge Recommendation (DC Rec) other (see comments)  (defer to ortho)   OT Rationale for DC Rec Pt has support from son and is moving well.   OT Brief overview of current status Mod I for BADL   OT Total Distance Amb During Session (feet) 30   OT Equipment Needed at Discharge   (daughter will obtain any tub bench or wide sock aide)   Total Session Time   Timed Code Treatment Minutes 30   Total Session Time (sum of timed and untimed services) 40   M [facility]  Services                                                                                   OUTPATIENT OCCUPATIONAL THERAPY    PLAN OF TREATMENT FOR OUTPATIENT REHABILITATION   Patient's Last Name, First Name, Kendall Deshpande YOB: 1955   Provider's Name   [facility]   Medical Record No.  [id number]     Onset Date: 03/26/25 Start of Care Date: 03/27/25     Medical Diagnosis:  TKA               OT Diagnosis:  decreased ADLs Certification Dates:  From: 03/27/25  To:       See note for plan of treatment, functional goals, and certification details.    I CERTIFY THE NEED FOR THESE SERVICES FURNISHED UNDER        THIS PLAN OF TREATMENT AND WHILE UNDER MY CARE (Physician co-signature of this document indicates review and certification of the therapy plan).

## 2025-03-27 NOTE — PROGRESS NOTES
"Orthopedic Progress Note      Assessment: 1 Day Post-Op  S/P Procedure(s):  LEFT TOTAL KNEE ARTHROPLASTY     Plan:   - Continue PT/OT.   - Weightbearing status: WBAT.  - Anticoagulation: Eliquis in addition to SCDs, cristian stockings and early ambulation.  - Discharge planning: Discharge to home today pending PT/OT and medical clearance     Subjective:  Pain: managed with oral medications  Nausea, Vomiting:  No  Chest pain: No  Lightheadedness, Dizziness:  No  Neuro:  Patient denies new onset numbness or paresthesias in left lower extremity     Patient is doing well on POD #1. Rates pain as an 8/10. Ambulating, tolerating oral intake, voiding, passing gas & pain is controlled with oral medication.      Objective:  /62 (BP Location: Right arm)   Pulse 82   Temp 98.1  F (36.7  C) (Oral)   Resp 20   Ht 1.803 m (5' 11\")   Wt 111.1 kg (245 lb)   SpO2 93%   BMI 34.17 kg/m    The patient is A&Ox3. Appears comfortable, sitting in recliner.  Sensation is intact to light touch & equal bilaterally in the L2 through S1 dermatomes.  Calves are soft and non-tender.  Dorsiflexion and plantar flexion is intact bilaterally.  Appropriate flexion and extension of the toes bilaterally.   Brisk capillary refill in the toes bilaterally.   Palpable left dorsalis pedis pulse.  left knee dressing C/D/I.       Pertinent Labs   Lab Results: personally reviewed.   No results found for: \"INR\", \"PROTIME\"  No results found for: \"WBC\", \"HGB\", \"HCT\", \"MCV\", \"PLT\"  No results found for: \"NA\", \"CO2\"      Report completed by:  Kat Graham PA-C/Dr. [facility]    Date: 3/27/2025  Time: 8:00 AM    "

## 2025-03-27 NOTE — PLAN OF CARE
Problem: Knee Arthroplasty  Goal: Optimal Pain Control and Function  Outcome: Progressing  Intervention: Prevent or Manage Pain    Problem: Knee Arthroplasty  Goal: Effective Urinary Elimination  Outcome: Progressing     Patient vital signs are at baseline: Yes  Patient able to ambulate as they were prior to admission or with assist devices provided by therapies during their stay:  Yes Aox1 w/ walker and gait belt.   Patient MUST void prior to discharge:  Yes   Patient able to tolerate oral intake:  Yes  Pain has adequate pain control using Oral analgesics:  Yes  Does patient have an identified :  Yes  Has goal D/C date and time been discussed with patient:  Yes     Patient a/o x4, VSS on RA. IV fluids SL. Tolerated IV ABX and oral medications. Ace wrap dressing CDI and has BL N\T in LLE. Pain was controlled with PRN Oxycodone, scheduled medications, ice therapy, repositioning, and rest.  Denied chest pain, SOB, lightheadedness, dizziness, or N/V.  Bed alarm on for safety precautions. Utilizes call light appropriately.  Hopeful to discharge home today.

## 2025-03-27 NOTE — PROGRESS NOTES
03/27/25 0905   Appointment Info   Signing Clinician's Name / Credentials (PT) Eliezer Billingsley, PT   Quick Adds   Quick Adds Certification;[facility] Auth & Certification   Living Environment   People in Home child(mj), adult   Current Living Arrangements house   Home Accessibility stairs to enter home;stairs within home   Number of Stairs, Main Entrance 4   Stair Railings, Main Entrance none   Number of Stairs, Within Home, Primary none   Self-Care   Usual Activity Tolerance good   Current Activity Tolerance poor   Equipment Currently Used at Home none   Fall history within last six months no   Activity/Exercise/Self-Care Comment Indep with all I ADL's and ADL   General Information   Onset of Illness/Injury or Date of Surgery 03/27/25   Referring Physician Dr. Brantley   Patient/Family Therapy Goals Statement (PT) to be able to walk without pain   Pertinent History of Current Problem (include personal factors and/or comorbidities that impact the POC) R TKA   Existing Precautions/Restrictions no known precautions/restrictions   Weight-Bearing Status - RLE weight-bearing as tolerated   Cognition   Affect/Mental Status (Cognition) WFL   Range of Motion (ROM)   ROM Comment decreased ROM s/p  R TKA   Strength (Manual Muscle Testing)   Strength (Manual Muscle Testing) No deficits observed during functional mobility   Transfers   Transfers sit-stand transfer   Sit-Stand Transfer   Sit-Stand Fajardo (Transfers) contact guard;verbal cues   Assistive Device (Sit-Stand Transfers) walker, front-wheeled   Balance   Balance no deficits were identified   Clinical Impression   Criteria for Skilled Therapeutic Intervention Yes, treatment indicated   PT Diagnosis (PT) impaired functional mobility   Influenced by the following impairments pain, decreased ROM, impaired balance, decreased strength   Functional limitations due to impairments gait, stairs, transfers   Clinical Presentation (PT Evaluation Complexity) stable   Clinical  Presentation Rationale pt presents as medically diagnosed   Clinical Decision Making (Complexity) low complexity   Planned Therapy Interventions (PT) gait training;home exercise program;patient/family education;stair training;transfer training   Risk & Benefits of therapy have been explained care plan/treatment goals reviewed;patient   PT Total Evaluation Time   PT Shona, Low Complexity Minutes (53473) 10   Therapy Certification   Start of care date 03/27/25   Certification date from 03/27/25   Certification date to 03/27/25   [facility] Authorization Information   Condition type Initial onset (within last 3 months)   Cause of current episode Post Surgical   Nature of treatment Rehabilitative   Functional ability Moderate functional limitations   Documented POC (choose all that apply) Measurable short and long term/discharge treatment goals related to physical and functional deficits.;Frequency of treatment visits and treatment activities to address deficit areas.;Patient agrees to program participation including home program   Briefly describe symptoms sharp stabbing pain in R knee   How did the symptoms start post op   Last 24H: avg pain/symptom intensity  6/10   Past wk: avg pain/symptom intensity 5/10   Frequency of Symptoms Frequently (51-75% of the time)   Symptom impact on ADLs Quite a bit   Condition change since eval No change   General health reported by patient Good   Type of surgery 5-Joint Replacement   Physical Therapy Goals   PT Frequency 2x/day   PT Predicted Duration/Target Date for Goal Attainment 03/27/25   PT Goals PT Goal 1;Transfers;Gait;Stairs   PT: Transfers Modified independent;Sit to/from stand;Assistive device   PT: Gait Modified independent;Rolling walker;100 feet   PT: Stairs 4 stairs;Within precautions;Minimal assist;Rail on right   PT: Goal 1 Independent with written HEP for LE strengthening and ROM   Interventions   Interventions Quick Adds Therapeutic Procedure;Therapeutic Activity;Gait  Training   Therapeutic Procedure/Exercise   Ther. Procedure: strength, endurance, ROM, flexibillity Minutes (49342) 15   Symptoms Noted During/After Treatment none   Treatment Detail/Skilled Intervention TKA protocol therex x10 reps, cueing for technique,   Therapeutic Activity   Therapeutic Activities: dynamic activities to improve functional performance Minutes (12352) 5   Symptoms Noted During/After Treatment Significant change in vital signs  (BP dropped)   Treatment Detail/Skilled Intervention sit to/from stand, cueing for safe hand placement and LE positioning, Mod I following education   PT Discharge Planning   PT Plan progress with gait/transfers as able, review Knee flexion stretch   PT Discharge Recommendation (DC Rec) other (see comments)  (per ortho MD)   PT Rationale for DC Rec Patient having low BP but should do well with mobiltiy once he is medically stable. Has good home setup and support   PT Brief overview of current status Patient cga with tranfers, low BP is limiting factor   PT Total Distance Amb During Session (feet) 0   PT Equipment Needed at Discharge cane, straight;walker, rolling   Physical Therapy Time and Intention   Timed Code Treatment Minutes 20   Total Session Time (sum of timed and untimed services) 30   M [facility]                                                                                   OUTPATIENT PHYSICAL THERAPY    PLAN OF TREATMENT FOR OUTPATIENT REHABILITATION   Patient's Last Name, First Name, RICO MendezKendall  F YOB: 1955   Provider's Name   [facility]   Medical Record No.  [id number]     Onset Date: 03/27/25 Start of Care Date: 03/27/25     Medical Diagnosis:                  PT Diagnosis:  impaired functional mobility Certification Dates:  From: 03/27/25  To: 03/27/25       See note for plan of treatment, functional goals, and certification details.    I CERTIFY THE NEED FOR THESE  SERVICES FURNISHED UNDER        THIS PLAN OF TREATMENT AND WHILE UNDER MY CARE (Physician co-signature of this document indicates review and certification of the therapy plan).

## 2025-03-28 NOTE — PROGRESS NOTES
"[facility]    Medicine Progress Note - Hospitalist Service    Date of Admission:  3/26/2025    Assessment & Plan      Kendall Mendez is a 69 year old male with a PMH of paroxysmal A-fib, CVA, chronically on Eliquis, HTN, FRANCESCO on CPAP, depression, alcohol abuse sober since 1994 and borderline diabetes.  Underwent left total knee arthroplasty 3/26.        Status post left total knee arthroplasty  Postoperative management per orthopedics.  Preoperative hemoglobin 12.3.     Hypotension - resolved  - likely due to mild hypovolemia  - resolved with 250 ml NS bolus and holding bp meds  - associated with dizzy/sob/chest discomfort episode  - no EKG changes, no exertional symptoms with therapy before and after.  - hold bp meds and call pcp if symptoms reoccur.    Obstructive sleep apnea  Utilize home CPAP per standard protocols.     History of CVA  Essential hypertension  Paroxysmal A-fib  Patient denies any deficits left over from his stroke.  Order home Norvasc and losartan with hold parameters.  Order bedtime Lipitor.     Pre-diabetes  Check an A1c.  Follow glucose readings 4 times a day.  Hold his Wegovy while hospitalized.  Continue metformin 500 twice daily      Anxiety/depression  Order home Effexor and bedtime Seroquel.     History of alcohol dependency in remission  Sober since 1994.     Chronic anticoagulation.  Plan to resume Eliquis today.        Diet: Discharge Instruction - Regular Diet Adult    DVT Prophylaxis: DOAC  Craven Catheter: Not present  Lines: None     Cardiac Monitoring: None  Code Status:  full    Clinically Significant Risk Factors Present on Admission                # Drug Induced Coagulation Defect: home medication list includes an anticoagulant medication    # Hypertension: Noted on problem list      # Anemia: based on hgb <11       # Obesity: Estimated body mass index is 34.17 kg/m  as calculated from the following:    Height as of this encounter: 1.803 m (5' 11\").   "  Weight as of this encounter: 111.1 kg (245 lb).              Social Drivers of Health    Tobacco Use: Medium Risk (3/26/2025)    Patient History     Smoking Tobacco Use: Former     Smokeless Tobacco Use: Never          Disposition Plan     Medically Ready for Discharge: Anticipated Today             Kvng Burr MD  Hospitalist Service  [facility]  Securely message with [employer] (more info)  Text page via [employer] Paging/Directory   ______________________________________________________________________    Interval History   Low BP episode resolved with  ml bolus.  Otherwise feels well.    Physical Exam   Vital Signs: Temp: 98.5  F (36.9  C) Temp src: Oral BP: 111/56 Pulse: 88   Resp: 18 SpO2: 95 % O2 Device: None (Room air)    Weight: 245 lbs 0 oz    Constitutional: awake, alert, cooperative, no apparent distress, and appears stated age  Respiratory: No increased work of breathing, good air exchange, clear to auscultation bilaterally, no crackles or wheezing  Cardiovascular: Normal apical impulse, regular rate and rhythm, normal S1 and S2, no S3 or S4, and no murmur noted  GI: normal bowel sounds  Musculoskeletal: incision not examined    Medical Decision Making       45 MINUTES SPENT BY ME on the date of service doing chart review, history, exam, documentation & further activities per the note.      Data     I have personally reviewed the following data over the past 24 hrs:    N/A  \   9.8 (L)   / N/A     N/A N/A N/A /  118 (H)   N/A N/A N/A \       Imaging results reviewed over the past 24 hrs:   No results found for this or any previous visit (from the past 24 hours).

## 2025-05-15 ENCOUNTER — TRANSFERRED RECORDS (OUTPATIENT)
Dept: HEALTH INFORMATION MANAGEMENT | Facility: CLINIC | Age: 70
End: 2025-05-15
Payer: COMMERCIAL

## 2025-08-27 ENCOUNTER — ANESTHESIA (OUTPATIENT)
Dept: SURGERY | Facility: CLINIC | Age: 70
End: 2025-08-27
Payer: COMMERCIAL

## 2025-08-27 ENCOUNTER — HOSPITAL ENCOUNTER (OUTPATIENT)
Facility: CLINIC | Age: 70
Discharge: HOME | End: 2025-08-28
Attending: ORTHOPAEDIC SURGERY | Admitting: ORTHOPAEDIC SURGERY
Payer: COMMERCIAL

## 2025-08-27 ENCOUNTER — ANESTHESIA EVENT (OUTPATIENT)
Dept: SURGERY | Facility: CLINIC | Age: 70
End: 2025-08-27
Payer: COMMERCIAL

## 2025-08-27 ENCOUNTER — APPOINTMENT (OUTPATIENT)
Dept: PHYSICAL THERAPY | Facility: CLINIC | Age: 70
End: 2025-08-27
Attending: ORTHOPAEDIC SURGERY
Payer: COMMERCIAL

## 2025-08-27 ENCOUNTER — APPOINTMENT (OUTPATIENT)
Dept: RADIOLOGY | Facility: CLINIC | Age: 70
End: 2025-08-27
Attending: PHYSICIAN ASSISTANT
Payer: COMMERCIAL

## 2025-08-27 PROBLEM — Z96.651 S/P TOTAL KNEE ARTHROPLASTY, RIGHT: Status: ACTIVE | Noted: 2025-08-27

## 2025-08-27 PROCEDURE — 999N000065 XR KNEE PORT RIGHT 1/2 VIEWS: Mod: RT

## 2025-08-27 PROCEDURE — 258N000003 HC RX IP 258 OP 636: Performed by: NURSE ANESTHETIST, CERTIFIED REGISTERED

## 2025-08-27 PROCEDURE — 250N000011 HC RX IP 250 OP 636: Performed by: NURSE ANESTHETIST, CERTIFIED REGISTERED

## 2025-08-27 PROCEDURE — 258N000003 HC RX IP 258 OP 636: Performed by: ANESTHESIOLOGY

## 2025-08-27 PROCEDURE — 250N000011 HC RX IP 250 OP 636: Performed by: PHYSICIAN ASSISTANT

## 2025-08-27 PROCEDURE — 250N000011 HC RX IP 250 OP 636: Performed by: ANESTHESIOLOGY

## 2025-08-27 RX ORDER — BUPIVACAINE HYDROCHLORIDE 5 MG/ML
INJECTION, SOLUTION EPIDURAL; INTRACAUDAL; PERINEURAL
Status: COMPLETED | OUTPATIENT
Start: 2025-08-27 | End: 2025-08-27

## 2025-08-27 RX ORDER — ONDANSETRON 2 MG/ML
INJECTION INTRAMUSCULAR; INTRAVENOUS PRN
Status: DISCONTINUED | OUTPATIENT
Start: 2025-08-27 | End: 2025-08-27

## 2025-08-27 RX ORDER — PROPOFOL 10 MG/ML
INJECTION, EMULSION INTRAVENOUS CONTINUOUS PRN
Status: DISCONTINUED | OUTPATIENT
Start: 2025-08-27 | End: 2025-08-27

## 2025-08-27 RX ORDER — DEXAMETHASONE SODIUM PHOSPHATE 10 MG/ML
INJECTION, SOLUTION INTRAMUSCULAR; INTRAVENOUS PRN
Status: DISCONTINUED | OUTPATIENT
Start: 2025-08-27 | End: 2025-08-27

## 2025-08-27 RX ORDER — BUPIVACAINE HYDROCHLORIDE 7.5 MG/ML
INJECTION, SOLUTION INTRASPINAL
Status: COMPLETED | OUTPATIENT
Start: 2025-08-27 | End: 2025-08-27

## 2025-08-27 RX ADMIN — PHENYLEPHRINE HYDROCHLORIDE 0.1 MCG/KG/MIN: 10 INJECTION INTRAVENOUS at 10:30

## 2025-08-27 RX ADMIN — Medication 2 G: at 10:08

## 2025-08-27 RX ADMIN — ONDANSETRON 4 MG: 2 INJECTION INTRAMUSCULAR; INTRAVENOUS at 10:20

## 2025-08-27 RX ADMIN — BUPIVACAINE HYDROCHLORIDE 15 ML: 5 INJECTION, SOLUTION EPIDURAL; INTRACAUDAL; PERINEURAL at 10:03

## 2025-08-27 RX ADMIN — BUPIVACAINE HYDROCHLORIDE IN DEXTROSE 2 ML: 7.5 INJECTION, SOLUTION SUBARACHNOID at 10:18

## 2025-08-27 RX ADMIN — PROPOFOL 150 MCG/KG/MIN: 10 INJECTION, EMULSION INTRAVENOUS at 10:18

## 2025-08-27 RX ADMIN — DEXAMETHASONE SODIUM PHOSPHATE 4 MG: 10 INJECTION, SOLUTION INTRAMUSCULAR; INTRAVENOUS at 10:20

## 2025-08-27 RX ADMIN — SODIUM CHLORIDE, SODIUM LACTATE, POTASSIUM CHLORIDE, AND CALCIUM CHLORIDE: .6; .31; .03; .02 INJECTION, SOLUTION INTRAVENOUS at 11:56

## 2025-08-27 ASSESSMENT — ACTIVITIES OF DAILY LIVING (ADL)
ADLS_ACUITY_SCORE: 26
ADLS_ACUITY_SCORE: 41
ADLS_ACUITY_SCORE: 41
ADLS_ACUITY_SCORE: 27
ADLS_ACUITY_SCORE: 43
ADLS_ACUITY_SCORE: 22
ADLS_ACUITY_SCORE: 27
ADLS_ACUITY_SCORE: 26
ADLS_ACUITY_SCORE: 27
ADLS_ACUITY_SCORE: 22
ADLS_ACUITY_SCORE: 22
ADLS_ACUITY_SCORE: 26
ADLS_ACUITY_SCORE: 41
ADLS_ACUITY_SCORE: 41
ADLS_ACUITY_SCORE: 26

## 2025-08-27 ASSESSMENT — COLUMBIA-SUICIDE SEVERITY RATING SCALE - C-SSRS
6. HAVE YOU EVER DONE ANYTHING, STARTED TO DO ANYTHING, OR PREPARED TO DO ANYTHING TO END YOUR LIFE?: NO
1. IN THE PAST MONTH, HAVE YOU WISHED YOU WERE DEAD OR WISHED YOU COULD GO TO SLEEP AND NOT WAKE UP?: NO
2. HAVE YOU ACTUALLY HAD ANY THOUGHTS OF KILLING YOURSELF IN THE PAST MONTH?: NO

## 2025-08-28 ASSESSMENT — ACTIVITIES OF DAILY LIVING (ADL)
ADLS_ACUITY_SCORE: 27

## (undated) DEVICE — SUTURE MONOCRYL 3-0 18 PS2 UND MCP497G

## (undated) DEVICE — BLADE SAW SAGITTAL STRK DUAL CUT 4118-135-090

## (undated) DEVICE — HOOD SURG T7PLUS PEEL AWAY FACE SHIELD STRL LF 0416-801-100

## (undated) DEVICE — CUSTOM PACK TOTAL KNEE ACCESSORY SOP5BTAHEA

## (undated) DEVICE — HOOD SURG T7 PLUS STRL LF DISP 0416-801-200

## (undated) DEVICE — SUTURE VICRYL+ 1 27IN CT-1 UND VCP261H

## (undated) DEVICE — ELECTRODE PATIENT RETURN ADULT L10 FT 2 PLATE CORD 0855C

## (undated) DEVICE — SUCTION MANIFOLD NEPTUNE 2 SYS 4 PORT 0702-020-000

## (undated) DEVICE — SUTURE VICRYL+ 2-0 27IN CT-1 UND VCP259H

## (undated) DEVICE — SOL WATER IRRIG 1000ML BOTTLE 2F7114

## (undated) DEVICE — GLOVE SURG PI ULTRA TOUCH M SZ 7 LF 42670

## (undated) DEVICE — GLOVE BIOGEL INDICATOR 7.5 LF 41675

## (undated) DEVICE — HOLDER LIMB VELCRO OR 0814-1533

## (undated) DEVICE — GLOVE UNDER INDICATOR PI SZ 8.5 LF 41685

## (undated) DEVICE — SOL NACL 0.9% IRRIG 3000ML BAG 2B7127

## (undated) DEVICE — SU VICRYL+ 0 27IN CT-1 UND VCP260H

## (undated) DEVICE — GOWN IMPERVIOUS BREATHABLE SMART LG 89015

## (undated) DEVICE — CUSTOM PACK TOTAL KNEE SOP5BTKHEC

## (undated) DEVICE — SOL ISOPROPYL RUBBING ALCOHOL USP 70% 4OZ HDX-20 I0020

## (undated) DEVICE — GLOVE BIOGEL PI SZ 8.0 40880

## (undated) DEVICE — VIAL DECANTER STERILE WHITE DYNJDEC06

## (undated) DEVICE — SU STRATAFIX PDS PLUS 1 CT-1 18" SXPP1A404

## (undated) RX ORDER — PROPOFOL 10 MG/ML
INJECTION, EMULSION INTRAVENOUS
Status: DISPENSED
Start: 2025-03-26

## (undated) RX ORDER — DEXAMETHASONE SODIUM PHOSPHATE 10 MG/ML
INJECTION, SOLUTION INTRAMUSCULAR; INTRAVENOUS
Status: DISPENSED
Start: 2025-03-26

## (undated) RX ORDER — FENTANYL CITRATE-0.9 % NACL/PF 10 MCG/ML
PLASTIC BAG, INJECTION (ML) INTRAVENOUS
Status: DISPENSED
Start: 2025-03-26

## (undated) RX ORDER — BUPIVACAINE HYDROCHLORIDE 7.5 MG/ML
INJECTION, SOLUTION INTRASPINAL
Status: DISPENSED
Start: 2025-03-26

## (undated) RX ORDER — ONDANSETRON 2 MG/ML
INJECTION INTRAMUSCULAR; INTRAVENOUS
Status: DISPENSED
Start: 2025-03-26